# Patient Record
Sex: FEMALE | Race: WHITE | NOT HISPANIC OR LATINO | Employment: UNEMPLOYED | URBAN - METROPOLITAN AREA
[De-identification: names, ages, dates, MRNs, and addresses within clinical notes are randomized per-mention and may not be internally consistent; named-entity substitution may affect disease eponyms.]

---

## 2017-05-15 ENCOUNTER — HOSPITAL ENCOUNTER (EMERGENCY)
Facility: OTHER | Age: 52
Discharge: HOME | End: 2017-05-15
Attending: EMERGENCY MEDICINE
Payer: MEDICAID

## 2017-05-15 VITALS
TEMPERATURE: 97 F | RESPIRATION RATE: 17 BRPM | BODY MASS INDEX: 35.85 KG/M2 | HEIGHT: 64 IN | SYSTOLIC BLOOD PRESSURE: 137 MMHG | WEIGHT: 210 LBS | DIASTOLIC BLOOD PRESSURE: 73 MMHG | HEART RATE: 70 BPM | OXYGEN SATURATION: 97 %

## 2017-05-15 DIAGNOSIS — N39.0 URINARY TRACT INFECTION WITHOUT HEMATURIA, SITE UNSPECIFIED: Primary | ICD-10-CM

## 2017-05-15 LAB
BILIRUBIN, POC UA: NEGATIVE
BLOOD, POC UA: NEGATIVE
CLARITY, POC UA: CLEAR
COLOR, POC UA: YELLOW
GLUCOSE, POC UA: NEGATIVE
KETONES, POC UA: NEGATIVE
LEUKOCYTE EST, POC UA: ABNORMAL
NITRITE, POC UA: NEGATIVE
PH UR STRIP: 5.5 [PH]
PROTEIN, POC UA: NEGATIVE
SPECIFIC GRAVITY, POC UA: <=1.005
UROBILINOGEN, POC UA: 0.2 E.U./DL

## 2017-05-15 PROCEDURE — 81003 URINALYSIS AUTO W/O SCOPE: CPT

## 2017-05-15 PROCEDURE — 99283 EMERGENCY DEPT VISIT LOW MDM: CPT | Mod: 25

## 2017-05-15 RX ORDER — PHENAZOPYRIDINE HYDROCHLORIDE 200 MG/1
200 TABLET, FILM COATED ORAL 3 TIMES DAILY
Qty: 6 TABLET | Refills: 0 | Status: SHIPPED | OUTPATIENT
Start: 2017-05-15 | End: 2017-05-25

## 2017-05-15 RX ORDER — NITROFURANTOIN (MACROCRYSTALS) 100 MG/1
100 CAPSULE ORAL EVERY 12 HOURS
Qty: 14 CAPSULE | Refills: 0 | Status: SHIPPED | OUTPATIENT
Start: 2017-05-15 | End: 2017-05-22

## 2017-05-15 NOTE — ED PROVIDER NOTES
"Encounter Date: 5/15/2017       History     Chief Complaint   Patient presents with    Female  Problem     burning with urination onset 4 days     Review of patient's allergies indicates:  No Known Allergies  The history is provided by the patient.   52 -year-old who says she has a "UTI".  Patient reports dysuria, hematuria, frequency for the last 4 days.  She has lower abdominal pain which she describes as burning.  No fever.  She does have mild pain across her lower back.  No nausea or vomiting.  Patient is a diabetic and said her blood sugar was 77 just prior to arrival.  She denies signs of a yeast infection.  Her symptoms worsen with urination.  She has been taking Azo and drinking cranberry juice without improvement.  Past Medical History:   Diagnosis Date    Diabetes mellitus     Hypercholesteremia     Hypertension     Thyroid disease      Past Surgical History:   Procedure Laterality Date    TUBAL LIGATION       History reviewed. No pertinent family history.  Social History   Substance Use Topics    Smoking status: Current Some Day Smoker     Last attempt to quit: 11/2/2012    Smokeless tobacco: None    Alcohol use None     Review of Systems   Constitutional: Negative for fever.   Gastrointestinal: Positive for abdominal pain. Negative for nausea and vomiting.   Genitourinary: Positive for dysuria, frequency and urgency.   Musculoskeletal: Positive for back pain.       Physical Exam   Initial Vitals   BP Pulse Resp Temp SpO2   05/15/17 0922 05/15/17 0922 05/15/17 0922 05/15/17 0922 05/15/17 0922   137/73 70 17 97.2 °F (36.2 °C) 97 %     Physical Exam    Nursing note and vitals reviewed.  Constitutional: No distress.   HENT:   Head: Atraumatic.   Eyes: Conjunctivae are normal.   Pulmonary/Chest: Breath sounds normal.   Abdominal: There is tenderness in the suprapubic area.   Neurological: She is alert and oriented to person, place, and time.   Skin: Skin is warm.   Psychiatric: She has a normal " "mood and affect.         ED Course   Procedures  Labs Reviewed   POCT URINALYSIS W/O SCOPE - Abnormal; Notable for the following:        Result Value    Glucose, UA Negative (*)     Bilirubin, UA Negative (*)     Ketones, UA Negative (*)     Spec Grav UA <=1.005 (*)     Blood, UA Negative (*)     Protein, UA Negative (*)     Nitrite, UA Negative (*)     All other components within normal limits   CULTURE, URINE   POCT URINALYSIS W/O SCOPE   POCT GLUCOSE MONITORING CONTINUOUS             Medical Decision Making:   Initial Assessment:   Patient presents with "UTI" symptoms.  She is alert and in no acute distress on exam.  ED Management:  UA was positive for white blood cells.  Culture was sent.  Patient was discharged on Pyridium and Macrodantin.  She was advised that she should return to the emergency department if she develops fever, vomiting, worsening of symptoms.                    ED Course     Clinical Impression:   The encounter diagnosis was Urinary tract infection without hematuria, site unspecified.          Pattie Lorenzo MD  05/15/17 1505    "

## 2017-05-15 NOTE — ED TRIAGE NOTES
51 y/o female presents to the ER with the cc of pain with urination and burning. Patient reports she has discomfort to the left back and flank area also. Patient reports she has had these symptoms before but these are worse. Patient denies blood in her urine denies abdominal cramping denies nausea or vomiting

## 2017-05-15 NOTE — ED AVS SNAPSHOT
[facility] EMERGENCY DEPARTMENT  [address] [facility] [address]               Kenna Robert   5/15/2017  9:21 AM   ED    Description:  Female : 1965   Department:  [facility] Emergency Department           Your Care was Coordinated By:     Provider Role From To    Pattie Lorenzo MD Attending Provider 05/15/17 0955 --      Reason for Visit     Female  Problem           Diagnoses this Visit        Comments    Urinary tract infection without hematuria, site unspecified    -  Primary       ED Disposition     None           To Do List           Follow-up Information     Follow up with Gio Barroso MD In 2 days.    Specialty:  Internal Medicine    Contact information:    [address] [facility] [address]  [phone]          Follow up with [facility] Emergency Department.    Specialty:  Emergency Medicine    Why:  If symptoms worsen    Contact information:    [address] [facility] [address]  [phone]       These Medications        Disp Refills Start End    nitrofurantoin (MACRODANTIN) 100 MG capsule 14 capsule 0 5/15/2017 2017    Take 1 capsule (100 mg total) by mouth every 12 (twelve) hours. - Oral    phenazopyridine (PYRIDIUM) 200 MG tablet 6 tablet 0 5/15/2017 2017    Take 1 tablet (200 mg total) by mouth 3 (three) times daily. - Oral      [facility]     [facility] On Call Nurse Care Line - 24/ Assistance  Unless otherwise directed by your provider, please contact [facility], our nurse care line that is available for  assistance.     Registered nurses in the [facility] On Call Center provide: appointment scheduling, clinical advisement, health education, and other advisory services.  Call: [phone] (toll free)               Medications           START taking these NEW medications        Refills    nitrofurantoin (MACRODANTIN) 100 MG capsule 0    Sig: Take 1 capsule (100 mg total) by mouth every 12 (twelve) hours.    Class: Print  "   Route: Oral    phenazopyridine (PYRIDIUM) 200 MG tablet 0    Sig: Take 1 tablet (200 mg total) by mouth 3 (three) times daily.    Class: Print    Route: Oral           Verify that the below list of medications is an accurate representation of the medications you are currently taking.  If none reported, the list may be blank. If incorrect, please contact your healthcare provider. Carry this list with you in case of emergency.           Current Medications     acetaminophen (TYLENOL) 500 MG tablet Take 2 tablets (1,000 mg total) by mouth 3 (three) times daily as needed for Pain.    citalopram (CELEXA) 10 MG tablet Take 20 mg by mouth once daily.     fluticasone (FLONASE) 50 mcg/actuation nasal spray 1 spray by Each Nare route 2 (two) times daily as needed for Rhinitis.    furosemide (LASIX) 20 MG tablet Take 20 mg by mouth 2 (two) times daily.    gemfibrozil (LOPID) 600 MG tablet Take 600 mg by mouth 2 (two) times daily before meals.    ibuprofen (ADVIL,MOTRIN) 200 MG tablet Take 2 tablets (400 mg total) by mouth every 6 (six) hours as needed for Pain.    levothyroxine (SYNTHROID) 100 MCG tablet Take 100 mcg by mouth once daily.      metformin (GLUCOPHAGE) 1000 MG tablet Take 1,000 mg by mouth 2 (two) times daily with meals.    nitrofurantoin (MACRODANTIN) 100 MG capsule Take 1 capsule (100 mg total) by mouth every 12 (twelve) hours.    omeprazole (PRILOSEC) 20 MG capsule Take 20 mg by mouth once daily.    phenazopyridine (PYRIDIUM) 200 MG tablet Take 1 tablet (200 mg total) by mouth 3 (three) times daily.           Clinical Reference Information           Your Vitals Were     BP Pulse Temp Resp Height Weight    137/73 (BP Location: Left arm, Patient Position: Sitting) 70 97.2 °F (36.2 °C) (Temporal) 17 5' 4" (1.626 m) 95.3 kg (210 lb)    Last Period SpO2 BMI          (Approximate) 97% 36.05 kg/m2        Allergies as of 5/15/2017     No Known Allergies      Immunizations Administered on Date of Encounter - " "5/15/2017     None      ED Micro, Lab, POCT     Start Ordered       Status Ordering Provider    05/15/17 1022 05/15/17 1021  Urine culture  Once      Ordered     05/15/17 0958 05/15/17 0957  POCT glucose  Once      Completed     05/15/17 0930 05/15/17 0930  POCT URINALYSIS W/O SCOPE  Once      Final result     05/15/17 0928 05/15/17 0928  POCT URINALYSIS W/O SCOPE  Once      Completed       ED Imaging Orders     None        Discharge Instructions         Bladder Infection, Female (Adult)    Urine is normally doesn't have any bacteria in it. But bacteria can get into the urinary tract from the skin around the rectum. Or they can travel in the blood from elsewhere in the body. Once they are in your urinary tract, they can cause infection in the urethra (urethritis), the bladder (cystitis), or the kidneys (pyelonephritis).  The most common place for an infection is in the bladder. This is called a bladder infection. This is one of the most common infections in women. Most bladder infections are easily treated. They are not serious unless the infection spreads to the kidney.  The phrases "bladder infection," "UTI," and "cystitis" are often used to describe the same thing. But they are not always the same. Cystitis is an inflammation of the bladder. The most common cause of cystitis is an infection.  Symptoms  The infection causes inflammation in the urethra and bladder. This causes many of the symptoms. The most common symptoms of a bladder infection are:  · Pain or burning when urinating  · Having to urinate more often than usual  · Urgent need to urinate  · Only a small amount of urine comes out  · Blood in urine  · Abdominal discomfort. This is usually in the lower abdomen above the pubic bone.  · Cloudy urine  · Strong- or bad-smelling urine  · Unable to urinate (urinary retention)  · Unable to hold urine in (urinary incontinence)  · Fever  · Loss of appetite  · Confusion (in older adults)  Causes  Bladder " infections are not contagious. You can't get one from someone else, from a toilet seat, or from sharing a bath.  The most common cause of bladder infections is bacteria from the bowels. The bacteria get onto the skin around the opening of the urethra. From there, they can get into the urine and travel up to the bladder, causing inflammation and infection. This usually happens because of:  · Wiping improperly after urinating. Always wipe from front to back.  · Bowel incontinence  · Pregnancy  · Procedures such as having a catheter inserted  · Older age  · Not emptying your bladder. This can allow bacteria a chance to grow in your urine.  · Dehydration  · Constipation  · Sex  · Use of a diaphragm for birth control   Treatment  Bladder infections are diagnosed by a urine test. They are treated with antibiotics and usually clear up quickly without complications. Treatment helps prevent a more serious kidney infection.  Medicines  Medicines can help in the treatment of a bladder infection:  · Take antibiotics until they are used up, even if you feel better. It is important to finish them to make sure the infection has cleared.  · You can use acetaminophen or ibuprofen for pain, fever, or discomfort, unless another medicine was prescribed. If you have chronic liver or kidney disease, talk with your healthcare provider before using these medicines. Also talk with your provider if you've ever had a stomach ulcer or gastrointestinal bleeding, or are taking blood-thinner medicines.  · If you are given phenazopydridine to reduce burning with urination, it will cause your urine to become a bright orange color. This can stain clothing.  Care and prevention  These self-care steps can help prevent future infections:  · Drink plenty of fluids to prevent dehydration and flush out your bladder. Do this unless you must restrict fluids for other health reasons, or your doctor told you not to.  · Proper cleaning after going to the  bathroom is important. Wipe from front to back after using the toilet to prevent the spread of bacteria.  · Urinate more often. Don't try to hold urine in for a long time.  · Wear loose-fitting clothes and cotton underwear. Avoid tight-fitting pants.  · Improve your diet and prevent constipation. Eat more fresh fruit and vegetables, and fiber, and less junk and fatty foods.  · Avoid sex until your symptoms are gone.  · Avoid caffeine, alcohol, and spicy foods. These can irritate your bladder.  · Urinate right after intercourse to flush out your bladder.  · If you use birth control pills and have frequent bladder infections, discuss it with your doctor.  Follow-up care  Call your healthcare provider if all symptoms are not gone after 3 days of treatment. This is especially important if you have repeat infections.  If a culture was done, you will be told if your treatment needs to be changed. If directed, you can call to find out the results.  If X-rays were done, you will be told if the results will affect your treatment.  Call 911  Call 911 if any of the following occur:  · Trouble breathing  · Hard to wake up or confusion  · Fainting or loss of consciousness  · Rapid heart rate  When to seek medical advice  Call your healthcare provider right away if any of these occur:  · Fever of 100.4ºF (38.0ºC) or higher, or as directed by your healthcare provider  · Symptoms are not better by the third day of treatment  · Back or belly (abdominal) pain that gets worse  · Repeated vomiting, or unable to keep medicine down  · Weakness or dizziness  · Vaginal discharge  · Pain, redness, or swelling in the outer vaginal area (labia)  Date Last Reviewed: 10/1/2016  © 5202-7642 [employer]. [address] [place] [address], [place] [address]. All rights reserved. This information is not intended as a substitute for professional medical care. Always follow your healthcare professional's instructions.          Smoking Cessation      If you would like to quit smoking:   You may be eligible for free services if you are a [place] resident and started smoking cigarettes before September 1, 1988.  Call the Smoking Cessation Trust (SCT) toll free at (759) 940-8302 or (794) 900-9989.   Call 1-800-QUIT-NOW if you do not meet the above criteria.   Contact us via email: [email or web address]   View our website for more information: [email or web address]         [facility] complies with applicable Federal civil rights laws and does not discriminate on the basis of race, color, national origin, age, disability, or sex.        Language Assistance Services     ATTENTION: Language assistance services are available, free of charge. Please call [phone].      ATENCIÓN: Si habla español, tiene a mckeon disposición servicios gratuitos de asistencia lingüística. Llame al [phone].     CHÚ Ý: N?u b?n nói Ti?ng Vi?t, có các d?ch v? h? tr? ngôn ng? mi?n phí dành cho b?n. G?i s? [phone].

## 2017-05-15 NOTE — DISCHARGE INSTRUCTIONS

## 2018-06-04 ENCOUNTER — HOSPITAL ENCOUNTER (EMERGENCY)
Facility: HOSPITAL | Age: 53
Discharge: HOME | End: 2018-06-04
Attending: EMERGENCY MEDICINE
Payer: MEDICAID

## 2018-06-04 VITALS
RESPIRATION RATE: 19 BRPM | SYSTOLIC BLOOD PRESSURE: 111 MMHG | HEIGHT: 64 IN | TEMPERATURE: 98 F | BODY MASS INDEX: 30.05 KG/M2 | DIASTOLIC BLOOD PRESSURE: 75 MMHG | OXYGEN SATURATION: 98 % | WEIGHT: 176 LBS | HEART RATE: 68 BPM

## 2018-06-04 DIAGNOSIS — H92.02 OTALGIA OF LEFT EAR: Primary | ICD-10-CM

## 2018-06-04 LAB — GLUCOSE SERPL-MCNC: 103 MG/DL (ref 70–110)

## 2018-06-04 PROCEDURE — 99283 EMERGENCY DEPT VISIT LOW MDM: CPT

## 2018-06-04 PROCEDURE — 25000003 PHARM REV CODE 250: Performed by: PHYSICIAN ASSISTANT

## 2018-06-04 RX ORDER — AMOXICILLIN 250 MG/1
500 CAPSULE ORAL
Status: COMPLETED | OUTPATIENT
Start: 2018-06-04 | End: 2018-06-04

## 2018-06-04 RX ORDER — AMOXICILLIN 500 MG/1
500 CAPSULE ORAL EVERY 12 HOURS
Qty: 20 CAPSULE | Refills: 0 | Status: SHIPPED | OUTPATIENT
Start: 2018-06-04 | End: 2018-06-14

## 2018-06-04 RX ADMIN — AMOXICILLIN 500 MG: 250 CAPSULE ORAL at 02:06

## 2018-06-04 NOTE — DISCHARGE INSTRUCTIONS
Use medications as prescribed. Follow-up with your primary care provider for reevaluation and further recommendations. Return to this ED if you begin with fever, if pain worsens, if you begin with neck or facial swelling, if any other problems occur.

## 2018-06-04 NOTE — ED PROVIDER NOTES
Encounter Date: 6/4/2018       History     Chief Complaint   Patient presents with    Otalgia     pt reports L ear pain since Thursday, w drainage     54yo F with chief complaint L otalgia x 5 days. She does admit to serous drainage from ear over the past two days. No fever or chills. No recent swimming excursions. She does endorse decreased hearing to affected ear. No facial swelling. No neck pain or stiffness. No odynophagia or dysphagia. Pain constant, worsening. No alleviating or exacerbating factors. No radiation of pain.          Review of patient's allergies indicates:  No Known Allergies  Past Medical History:   Diagnosis Date    Diabetes mellitus     Hypercholesteremia     Hypertension     Thyroid disease      Past Surgical History:   Procedure Laterality Date    TUBAL LIGATION       History reviewed. No pertinent family history.  Social History   Substance Use Topics    Smoking status: Current Some Day Smoker     Last attempt to quit: 11/2/2012    Smokeless tobacco: Not on file    Alcohol use Not on file     Review of Systems   Constitutional: Negative for fever.   HENT: Positive for ear discharge and ear pain. Negative for congestion, facial swelling, rhinorrhea and sore throat.    Eyes: Negative.    Respiratory: Negative for shortness of breath.    Cardiovascular: Negative for chest pain.   Gastrointestinal: Negative for nausea and vomiting.   Endocrine: Negative.    Genitourinary: Negative for dysuria.   Musculoskeletal: Negative for back pain, neck pain and neck stiffness.   Skin: Negative for rash.   Neurological: Negative for weakness and headaches.   Hematological: Does not bruise/bleed easily.   Psychiatric/Behavioral: Negative.    All other systems reviewed and are negative.      Physical Exam     Initial Vitals [06/04/18 1357]   BP Pulse Resp Temp SpO2   137/86 81 19 97.7 °F (36.5 °C) 99 %      MAP       103         Physical Exam    Nursing note and vitals reviewed.  Constitutional: She  appears well-developed and well-nourished. She is not diaphoretic. No distress.   HENT:   Head: Normocephalic and atraumatic.   Mouth/Throat: Oropharynx is clear and moist.   L TM with scarring, possible small perforation to 2 o'clock area. TM is dull, without loss of landmarks, no hyperemia, however possible purulent effusion. Ear canal without hyperemia, edema, or exudate. No mastoid swelling or ttp. Mild preauricular ttp. Mild ear motion pain.   Eyes: Conjunctivae and EOM are normal. Pupils are equal, round, and reactive to light.   Neck: Normal range of motion. Neck supple. No tracheal deviation present.   Cardiovascular: Intact distal pulses.   Pulmonary/Chest: Breath sounds normal. No stridor. No respiratory distress. She has no wheezes.   Abdominal: Soft. Bowel sounds are normal. She exhibits no distension. There is no tenderness.   Musculoskeletal: Normal range of motion. She exhibits no tenderness.   Lymphadenopathy:     She has no cervical adenopathy.   Neurological: She is alert and oriented to person, place, and time.   Skin: Skin is warm and dry. Capillary refill takes less than 2 seconds.   Psychiatric: She has a normal mood and affect. Her behavior is normal. Judgment and thought content normal.         ED Course   Procedures  Labs Reviewed   POCT GLUCOSE             Medical Decision Making:   Differential Diagnosis:   Otitis media, otitis externa, mastoiditis, viral illness  ED Management:  53-year-old female chief complaint otalgia with drainage.  She denies fever or chills.  Denies odynophagia or dysphagia.  No neck pain or stiffness.  She does admit to decreased hearing to affected ear.  Overall well-appearing and nontoxic.  Vitals reassuring.  She does admit to frequent ear infections over the past few years. On exam, possible otitis media with perforation. Ear canal is unremarkable, however pt is diabetic. There is preauricular and ear motion pain. No lymphadenopathy. No mastoid swelling or ttp.  Neck supple. Given presentation, will d/c with PO and topical abx. I do feel she is safe and stable for d/c without further intervention. She does understand and agree. Return precautions given.  Other:   I have discussed this case with another health care provider.       <> Summary of the Discussion: Dr. Lorenzo              Attending Attestation:     Physician Attestation Statement for NP/PA:   I have conducted a face to face encounter with this patient in addition to the NP/PA, due to NP/PA Request    Other NP/PA Attestation Additions:    History of Present Illness: 53-year-old who complains of pain and drainage from her left ear   Physical Exam: Patient has scarring and mild yellow drainage noted to the tympanic membrane   Medical Decision Making: Patient will be treated with corticosporin  otic and amoxicillin.  She will be referred to ENT.                    Clinical Impression:   The encounter diagnosis was Otalgia of left ear.  Disposition:   Disposition: Discharged  Condition: Stable                        Jacob Franz PA-C  06/04/18 1504       Pattie Lorenzo MD  06/04/18 9650

## 2018-09-12 ENCOUNTER — HOSPITAL ENCOUNTER (EMERGENCY)
Facility: HOSPITAL | Age: 53
Discharge: HOME | End: 2018-09-13
Attending: EMERGENCY MEDICINE
Payer: MEDICAID

## 2018-09-12 DIAGNOSIS — M79.671 FOOT PAIN, RIGHT: Primary | ICD-10-CM

## 2018-09-12 DIAGNOSIS — S90.31XA CONTUSION OF RIGHT FOOT, INITIAL ENCOUNTER: ICD-10-CM

## 2018-09-12 PROCEDURE — 99283 EMERGENCY DEPT VISIT LOW MDM: CPT | Mod: 25

## 2018-09-12 RX ORDER — IBUPROFEN 400 MG/1
800 TABLET ORAL
Status: DISCONTINUED | OUTPATIENT
Start: 2018-09-12 | End: 2018-09-13 | Stop reason: HOSPADM

## 2018-09-13 VITALS
BODY MASS INDEX: 29.02 KG/M2 | OXYGEN SATURATION: 95 % | DIASTOLIC BLOOD PRESSURE: 85 MMHG | WEIGHT: 170 LBS | TEMPERATURE: 98 F | HEIGHT: 64 IN | RESPIRATION RATE: 18 BRPM | HEART RATE: 65 BPM | SYSTOLIC BLOOD PRESSURE: 148 MMHG

## 2018-09-13 NOTE — ED TRIAGE NOTES
Patient stated she was leaving out of ED door from visiting friend in ICU and stepped in hole and twisted her right foot. Pain 8/10. Throbbing and aching in nature.

## 2018-09-13 NOTE — ED PROVIDER NOTES
Encounter Date: 2018     This is a 53 y.o. female complaining of right ankle and foot pain secondary to tripping and falling in the parking lot while leaving the hospital after visiting another patient.    I have evaluated and conducted a medical screening exam with initial orders entered, if indicated, to expedite care. The patient will be placed in a treatment area when one is available. Care will be transferred to an alternate provider for a full assessment including but not limited to: history, physical exam, additional orders, and final disposition.    Toni Bragg NP         History     Chief Complaint   Patient presents with    Ankle Pain     recent trip and fall minutes P[employer]; reports right ankle pain on right side of foot; mild swelling noted, not painful to palpation     52 y/o female which presents with right foot pain after she stepped in a hole outside of the hospital. She was at [facility] visiting a friend in the ICU and upon exiting through the ED doors, she stepped in a hole near a manhole. The patient's foot slipped in the hole and now she is having right foot pain. Pt denies edema, bruising, tingling, numbness or loss of mobility. Pt denies ankle pain.       The history is provided by the patient.     Review of patient's allergies indicates:  No Known Allergies  Past Medical History:   Diagnosis Date    Hypercholesteremia     Hypertension     Thyroid disease      Past Surgical History:   Procedure Laterality Date    TUBAL LIGATION       History reviewed. No pertinent family history.  Social History     Tobacco Use    Smoking status: Current Some Day Smoker     Last attempt to quit: 2012     Years since quittin.8   Substance Use Topics    Alcohol use: No     Frequency: Never    Drug use: No     Review of Systems   Constitutional: Negative for chills and fever.   Respiratory: Negative for chest tightness and stridor.    Cardiovascular: Negative for chest pain.    Gastrointestinal: Negative for abdominal pain.   Musculoskeletal: Positive for myalgias. Negative for arthralgias, gait problem and joint swelling.   All other systems reviewed and are negative.      Physical Exam     Initial Vitals [09/12/18 2234]   BP Pulse Resp Temp SpO2   (!) 179/94 83 16 98.1 °F (36.7 °C) 97 %      MAP       --         Physical Exam    Nursing note and vitals reviewed.  Constitutional: She appears well-developed and well-nourished.   HENT:   Head: Normocephalic and atraumatic.   Eyes: EOM are normal. Pupils are equal, round, and reactive to light.   Cardiovascular: Normal rate, regular rhythm, normal heart sounds and intact distal pulses. Exam reveals no gallop and no friction rub.    No murmur heard.  Pulmonary/Chest: Breath sounds normal. No respiratory distress. She has no wheezes. She has no rhonchi. She has no rales. She exhibits no tenderness.   Abdominal: Soft. Bowel sounds are normal.   Musculoskeletal: Normal range of motion. She exhibits tenderness. She exhibits no edema.        Right ankle: She exhibits normal range of motion, no swelling, no ecchymosis, no deformity, no laceration and normal pulse. Tenderness. No lateral malleolus, no medial malleolus, no AITFL, no CF ligament, no posterior TFL, no head of 5th metatarsal and no proximal fibula tenderness found. Achilles tendon normal.        Feet:    Pinpoint tenderness to base of the 5th metatarsal- no edema or ecchymosis noted    Neurological: She is alert and oriented to person, place, and time. She has normal strength. GCS score is 15. GCS eye subscore is 4. GCS verbal subscore is 5. GCS motor subscore is 6.       ED Course   Procedures  Labs Reviewed - No data to display       Imaging Results          X-Ray Foot Complete Right (Final result)  Result time 09/12/18 23:30:31    Final result by Jose Chun MD (09/12/18 23:30:31)                 Impression:      1. No displaced fractures.      Electronically signed  by: Jose Chun MD  Date:    09/12/2018  Time:    23:30             Narrative:    EXAMINATION:  XR ANKLE COMPLETE 3 VIEW RIGHT; XR FOOT COMPLETE 3 VIEW RIGHT    CLINICAL HISTORY:  Fall on same level from slipping, tripping and stumbling without subsequent striking against object, initial encounter    TECHNIQUE:  AP, lateral, and oblique images of the right ankle and foot were performed.    COMPARISON:  None.    FINDINGS:  No displaced fractures.  No osseous destruction.  Tibial plafond and talar dome are normal.  Ankle mortise is symmetric.  Tarsometatarsal alignment is maintained noting nonweightbearing films.  Distal Achilles and plantar fascia enthesopathy noted.  Subcutaneous soft tissues are within normal limits.  No radiopaque foreign bodies.                               X-Ray Ankle Complete Right (Final result)  Result time 09/12/18 23:30:31    Final result by Jose Chun MD (09/12/18 23:30:31)                 Impression:      1. No displaced fractures.      Electronically signed by: Jose Chun MD  Date:    09/12/2018  Time:    23:30             Narrative:    EXAMINATION:  XR ANKLE COMPLETE 3 VIEW RIGHT; XR FOOT COMPLETE 3 VIEW RIGHT    CLINICAL HISTORY:  Fall on same level from slipping, tripping and stumbling without subsequent striking against object, initial encounter    TECHNIQUE:  AP, lateral, and oblique images of the right ankle and foot were performed.    COMPARISON:  None.    FINDINGS:  No displaced fractures.  No osseous destruction.  Tibial plafond and talar dome are normal.  Ankle mortise is symmetric.  Tarsometatarsal alignment is maintained noting nonweightbearing films.  Distal Achilles and plantar fascia enthesopathy noted.  Subcutaneous soft tissues are within normal limits.  No radiopaque foreign bodies.                                 Medical Decision Making:   Initial Assessment:   54 y/o female with right foot pain after her foot slipped in a hole outside of the ED  entrance. No edema or ecchymosis noted. Pinpoint tenderness to the base of the 5th metatarsal.  Differential Diagnosis:   Foot fracture, foot contusion, ankle sprain   Clinical Tests:   Radiological Study: Reviewed and Ordered  ED Management:  Pt examined. Radiological studies were negative for fracture. Pt did not want to wait for the ibuprofen ordered and said she can take it at home. Pt given strict return precautions and voiced understanding.                       Clinical Impression:   The primary encounter diagnosis was Foot pain, right. A diagnosis of Contusion of right foot, initial encounter was also pertinent to this visit.                             Nusrat Molina, JESSY  09/12/18 [id number]

## 2019-01-10 ENCOUNTER — HOSPITAL ENCOUNTER (EMERGENCY)
Facility: HOSPITAL | Age: 54
Discharge: HOME | End: 2019-01-10
Attending: EMERGENCY MEDICINE
Payer: MEDICAID

## 2019-01-10 VITALS
OXYGEN SATURATION: 99 % | HEIGHT: 64 IN | SYSTOLIC BLOOD PRESSURE: 155 MMHG | BODY MASS INDEX: 31.58 KG/M2 | TEMPERATURE: 98 F | WEIGHT: 185 LBS | HEART RATE: 86 BPM | DIASTOLIC BLOOD PRESSURE: 76 MMHG | RESPIRATION RATE: 16 BRPM

## 2019-01-10 DIAGNOSIS — J01.00 ACUTE NON-RECURRENT MAXILLARY SINUSITIS: ICD-10-CM

## 2019-01-10 DIAGNOSIS — J32.9 SINUSITIS, UNSPECIFIED CHRONICITY, UNSPECIFIED LOCATION: Primary | ICD-10-CM

## 2019-01-10 PROCEDURE — 99284 EMERGENCY DEPT VISIT MOD MDM: CPT | Mod: 25,ER

## 2019-01-10 PROCEDURE — 94640 AIRWAY INHALATION TREATMENT: CPT | Mod: ER

## 2019-01-10 PROCEDURE — 96372 THER/PROPH/DIAG INJ SC/IM: CPT | Mod: ER

## 2019-01-10 PROCEDURE — 94760 N-INVAS EAR/PLS OXIMETRY 1: CPT | Mod: ER

## 2019-01-10 PROCEDURE — 63600175 PHARM REV CODE 636 W HCPCS: Mod: ER | Performed by: NURSE PRACTITIONER

## 2019-01-10 PROCEDURE — 25000242 PHARM REV CODE 250 ALT 637 W/ HCPCS: Mod: ER | Performed by: NURSE PRACTITIONER

## 2019-01-10 RX ORDER — IPRATROPIUM BROMIDE AND ALBUTEROL SULFATE 2.5; .5 MG/3ML; MG/3ML
3 SOLUTION RESPIRATORY (INHALATION) ONCE
Status: COMPLETED | OUTPATIENT
Start: 2019-01-10 | End: 2019-01-10

## 2019-01-10 RX ORDER — PROMETHAZINE HYDROCHLORIDE AND DEXTROMETHORPHAN HYDROBROMIDE 6.25; 15 MG/5ML; MG/5ML
5 SYRUP ORAL EVERY 6 HOURS PRN
Qty: 120 ML | Refills: 0 | Status: SHIPPED | OUTPATIENT
Start: 2019-01-10 | End: 2019-01-20

## 2019-01-10 RX ORDER — DEXAMETHASONE SODIUM PHOSPHATE 4 MG/ML
12 INJECTION, SOLUTION INTRA-ARTICULAR; INTRALESIONAL; INTRAMUSCULAR; INTRAVENOUS; SOFT TISSUE
Status: DISCONTINUED | OUTPATIENT
Start: 2019-01-10 | End: 2019-01-10

## 2019-01-10 RX ORDER — DEXAMETHASONE SODIUM PHOSPHATE 4 MG/ML
8 INJECTION, SOLUTION INTRA-ARTICULAR; INTRALESIONAL; INTRAMUSCULAR; INTRAVENOUS; SOFT TISSUE
Status: COMPLETED | OUTPATIENT
Start: 2019-01-10 | End: 2019-01-10

## 2019-01-10 RX ORDER — ALBUTEROL SULFATE 2.5 MG/.5ML
2.5 SOLUTION RESPIRATORY (INHALATION) EVERY 4 HOURS PRN
Qty: 1 EACH | Refills: 0 | OUTPATIENT
Start: 2019-01-10 | End: 2023-12-01

## 2019-01-10 RX ORDER — AMOXICILLIN 875 MG/1
875 TABLET, FILM COATED ORAL 2 TIMES DAILY
Qty: 20 TABLET | Refills: 0 | Status: SHIPPED | OUTPATIENT
Start: 2019-01-10 | End: 2019-01-20

## 2019-01-10 RX ORDER — FLUTICASONE PROPIONATE 50 MCG
1 SPRAY, SUSPENSION (ML) NASAL 2 TIMES DAILY PRN
Qty: 1 BOTTLE | Refills: 1 | Status: SHIPPED | OUTPATIENT
Start: 2019-01-10

## 2019-01-10 RX ADMIN — IPRATROPIUM BROMIDE AND ALBUTEROL SULFATE 3 ML: .5; 3 SOLUTION RESPIRATORY (INHALATION) at 10:01

## 2019-01-10 RX ADMIN — DEXAMETHASONE SODIUM PHOSPHATE 8 MG: 4 INJECTION, SOLUTION INTRA-ARTICULAR; INTRALESIONAL; INTRAMUSCULAR; INTRAVENOUS; SOFT TISSUE at 09:01

## 2019-01-11 NOTE — DISCHARGE INSTRUCTIONS
Follow-up with PCP in 1-2 days.  Return to ED for worsening of symptoms.   Tylenol as needed for fever.

## 2019-01-11 NOTE — ED PROVIDER NOTES
Encounter Date: 1/10/2019       History     Chief Complaint   Patient presents with    Cough     pt presents to ER with c/o uri symptoms for 4-5 days,  +fever +productive cough.       A nontoxic 53-year-old female who presents with nasal congestion and a productive cough for 4-5 days.  Patient reports a low-grade fever.  Patient denies sick contacts. She smokes 1 ppd cigarettes.       The history is provided by the patient.   Cough   This is a new problem. The current episode started several days ago. The problem has been gradually worsening. The cough is productive of purulent sputum. The maximum temperature recorded prior to her arrival was 100 - 100.9 F. Pertinent negatives include no chest pain and no shortness of breath. She has tried nothing for the symptoms.     Review of patient's allergies indicates:   Allergen Reactions    Lisinopril      Bad cough    Statins-hmg-coa reductase inhibitors      Muscle aches     Past Medical History:   Diagnosis Date    Hypercholesteremia     Hypertension     Thyroid disease      Past Surgical History:   Procedure Laterality Date    TUBAL LIGATION       History reviewed. No pertinent family history.  Social History     Tobacco Use    Smoking status: Current Some Day Smoker     Last attempt to quit: 2012     Years since quittin.1   Substance Use Topics    Alcohol use: No     Frequency: Never    Drug use: No     Review of Systems   Constitutional: Positive for fever.   HENT: Positive for congestion.    Eyes: Negative.    Respiratory: Positive for cough. Negative for shortness of breath.    Cardiovascular: Negative.  Negative for chest pain.   Gastrointestinal: Negative.    Endocrine: Negative.    Genitourinary: Negative.    Musculoskeletal: Negative.    Skin: Negative.    Allergic/Immunologic: Negative.    Neurological: Negative.    Hematological: Negative.    All other systems reviewed and are negative.      Physical Exam     Initial Vitals [01/10/19 2016]    BP Pulse Resp Temp SpO2   136/69 68 18 97.9 °F (36.6 °C) 96 %      MAP       --         Physical Exam    Nursing note and vitals reviewed.  Constitutional: Vital signs are normal. She appears well-developed. She is cooperative. She does not appear ill.   HENT:   Right Ear: External ear normal.   Left Ear: External ear normal.   Nose: Mucosal edema present. Right sinus exhibits frontal sinus tenderness. Left sinus exhibits frontal sinus tenderness.   Mouth/Throat: Oropharynx is clear and moist.   Eyes: Conjunctivae and lids are normal.   Neck: Normal range of motion. Neck supple.   Cardiovascular: Normal rate, regular rhythm, S1 normal, S2 normal and normal heart sounds.   Pulmonary/Chest: Effort normal. She has wheezes in the right lower field and the left lower field.   Expiratory wheezing   Abdominal: Soft. Normal appearance. There is no tenderness.   Musculoskeletal: Normal range of motion.   Neurological: She is alert and oriented to person, place, and time.   Skin: Skin is warm, dry and intact.   Psychiatric: She has a normal mood and affect. Her speech is normal. Thought content normal. Cognition and memory are normal.         ED Course   Procedures  Labs Reviewed - No data to display       Imaging Results          X-Ray Chest PA And Lateral (Final result)  Result time 01/10/19 20:48:46    Final result by Fei Shultz MD (01/10/19 20:48:46)                 Impression:      1. Hypoventilatory exam, no acute cardiopulmonary process.      Electronically signed by: Fei Shultz MD  Date:    01/10/2019  Time:    20:48             Narrative:    EXAMINATION:  XR CHEST PA AND LATERAL    CLINICAL HISTORY:  cough;    TECHNIQUE:  PA and lateral views of the chest were performed.    COMPARISON:  CT 02/22/2012, radiograph 12/21/2012    FINDINGS:  The cardiomediastinal silhouette is not enlarged.  There is no pleural effusion.  The trachea is midline.  The lungs are symmetrically expanded bilaterally without  evidence of acute parenchymal process. No large focal consolidation seen.  There is no pneumothorax.  The osseous structures are remarkable for degenerative changes..                                 Medical Decision Making:   Initial Assessment:   A nontoxic 53-year-old female who presents with nasal congestion and a productive cough for 4-5 days.  Patient reports a low-grade fever.  Patient denies sick contacts.    Differential Diagnosis:   Acute sinusitis, acute bronchitis, pneumonia  Clinical Tests:   Radiological Study: Ordered and Reviewed  ED Management:  Dual nebs administered with improving of wheezing.   Decadron 8 mg IM.   Discharged home with Amoxicillin, Promethazine DM, and Flonase.  Pt instructed on smoking cessation. Pt verbalized understanding.   Follow-up with PCP in 1-2 days.                       Clinical Impression:   The primary encounter diagnosis was Sinusitis, unspecified chronicity, unspecified location. A diagnosis of Acute non-recurrent maxillary sinusitis was also pertinent to this visit.                             JESSY Cuellar  01/10/19 [id number]

## 2021-07-01 ENCOUNTER — PATIENT MESSAGE (OUTPATIENT)
Dept: ADMINISTRATIVE | Facility: OTHER | Age: 56
End: 2021-07-01

## 2023-09-12 ENCOUNTER — HOSPITAL ENCOUNTER (EMERGENCY)
Facility: HOSPITAL | Age: 58
Discharge: HOME | End: 2023-09-12
Attending: EMERGENCY MEDICINE
Payer: MEDICAID

## 2023-09-12 VITALS
RESPIRATION RATE: 18 BRPM | OXYGEN SATURATION: 95 % | DIASTOLIC BLOOD PRESSURE: 73 MMHG | HEIGHT: 64 IN | HEART RATE: 62 BPM | TEMPERATURE: 98 F | BODY MASS INDEX: 36.19 KG/M2 | SYSTOLIC BLOOD PRESSURE: 133 MMHG | WEIGHT: 212 LBS

## 2023-09-12 DIAGNOSIS — R07.9 CHEST PAIN: Primary | ICD-10-CM

## 2023-09-12 LAB
ALBUMIN SERPL BCP-MCNC: 4.1 G/DL (ref 3.5–5.2)
ALP SERPL-CCNC: 75 U/L (ref 55–135)
ALT SERPL W/O P-5'-P-CCNC: 35 U/L (ref 10–44)
ANION GAP SERPL CALC-SCNC: 9 MMOL/L (ref 8–16)
AST SERPL-CCNC: 48 U/L (ref 10–40)
BASOPHILS # BLD AUTO: 0.12 K/UL (ref 0–0.2)
BASOPHILS NFR BLD: 1.7 % (ref 0–1.9)
BILIRUB SERPL-MCNC: 0.4 MG/DL (ref 0.1–1)
BNP SERPL-MCNC: 11 PG/ML (ref 0–99)
BUN SERPL-MCNC: 13 MG/DL (ref 6–20)
CALCIUM SERPL-MCNC: 10.5 MG/DL (ref 8.7–10.5)
CHLORIDE SERPL-SCNC: 104 MMOL/L (ref 95–110)
CO2 SERPL-SCNC: 24 MMOL/L (ref 23–29)
CREAT SERPL-MCNC: 1 MG/DL (ref 0.5–1.4)
D DIMER PPP IA.FEU-MCNC: 0.3 MG/L FEU
DIFFERENTIAL METHOD: ABNORMAL
EOSINOPHIL # BLD AUTO: 0.2 K/UL (ref 0–0.5)
EOSINOPHIL NFR BLD: 3 % (ref 0–8)
ERYTHROCYTE [DISTWIDTH] IN BLOOD BY AUTOMATED COUNT: 13.1 % (ref 11.5–14.5)
EST. GFR  (NO RACE VARIABLE): >60 ML/MIN/1.73 M^2
GLUCOSE SERPL-MCNC: 126 MG/DL (ref 70–110)
HCT VFR BLD AUTO: 44 % (ref 37–48.5)
HGB BLD-MCNC: 14.9 G/DL (ref 12–16)
IMM GRANULOCYTES # BLD AUTO: 0.04 K/UL (ref 0–0.04)
IMM GRANULOCYTES NFR BLD AUTO: 0.6 % (ref 0–0.5)
LIPASE SERPL-CCNC: 50 U/L (ref 4–60)
LYMPHOCYTES # BLD AUTO: 3 K/UL (ref 1–4.8)
LYMPHOCYTES NFR BLD: 42.8 % (ref 18–48)
MCH RBC QN AUTO: 30.8 PG (ref 27–31)
MCHC RBC AUTO-ENTMCNC: 33.9 G/DL (ref 32–36)
MCV RBC AUTO: 91 FL (ref 82–98)
MONOCYTES # BLD AUTO: 0.4 K/UL (ref 0.3–1)
MONOCYTES NFR BLD: 5.1 % (ref 4–15)
NEUTROPHILS # BLD AUTO: 3.3 K/UL (ref 1.8–7.7)
NEUTROPHILS NFR BLD: 46.8 % (ref 38–73)
NRBC BLD-RTO: 0 /100 WBC
PLATELET # BLD AUTO: 283 K/UL (ref 150–450)
PMV BLD AUTO: 9.8 FL (ref 9.2–12.9)
POCT GLUCOSE: 124 MG/DL (ref 70–110)
POTASSIUM SERPL-SCNC: 4.1 MMOL/L (ref 3.5–5.1)
PROT SERPL-MCNC: 7.8 G/DL (ref 6–8.4)
RBC # BLD AUTO: 4.83 M/UL (ref 4–5.4)
SODIUM SERPL-SCNC: 137 MMOL/L (ref 136–145)
TROPONIN I SERPL DL<=0.01 NG/ML-MCNC: <0.006 NG/ML (ref 0–0.03)
WBC # BLD AUTO: 7.08 K/UL (ref 3.9–12.7)

## 2023-09-12 PROCEDURE — 82962 GLUCOSE BLOOD TEST: CPT

## 2023-09-12 PROCEDURE — 93010 ELECTROCARDIOGRAM REPORT: CPT | Mod: ,,, | Performed by: INTERNAL MEDICINE

## 2023-09-12 PROCEDURE — 99285 EMERGENCY DEPT VISIT HI MDM: CPT | Mod: 25

## 2023-09-12 PROCEDURE — 84484 ASSAY OF TROPONIN QUANT: CPT | Performed by: EMERGENCY MEDICINE

## 2023-09-12 PROCEDURE — 83880 ASSAY OF NATRIURETIC PEPTIDE: CPT | Performed by: EMERGENCY MEDICINE

## 2023-09-12 PROCEDURE — 85379 FIBRIN DEGRADATION QUANT: CPT | Performed by: EMERGENCY MEDICINE

## 2023-09-12 PROCEDURE — 93010 EKG 12-LEAD: ICD-10-PCS | Mod: ,,, | Performed by: INTERNAL MEDICINE

## 2023-09-12 PROCEDURE — 85025 COMPLETE CBC W/AUTO DIFF WBC: CPT | Performed by: EMERGENCY MEDICINE

## 2023-09-12 PROCEDURE — 25000003 PHARM REV CODE 250: Performed by: EMERGENCY MEDICINE

## 2023-09-12 PROCEDURE — 93005 ELECTROCARDIOGRAM TRACING: CPT

## 2023-09-12 PROCEDURE — 83690 ASSAY OF LIPASE: CPT | Performed by: EMERGENCY MEDICINE

## 2023-09-12 PROCEDURE — 80053 COMPREHEN METABOLIC PANEL: CPT | Performed by: EMERGENCY MEDICINE

## 2023-09-12 RX ORDER — FAMOTIDINE 20 MG/1
40 TABLET, FILM COATED ORAL 2 TIMES DAILY
Qty: 20 TABLET | Refills: 0 | Status: SHIPPED | OUTPATIENT
Start: 2023-09-12 | End: 2024-09-11

## 2023-09-12 RX ORDER — ACETAMINOPHEN 500 MG
1000 TABLET ORAL
Status: COMPLETED | OUTPATIENT
Start: 2023-09-12 | End: 2023-09-12

## 2023-09-12 RX ORDER — MAG HYDROX/ALUMINUM HYD/SIMETH 200-200-20
30 SUSPENSION, ORAL (FINAL DOSE FORM) ORAL ONCE
Status: COMPLETED | OUTPATIENT
Start: 2023-09-12 | End: 2023-09-12

## 2023-09-12 RX ORDER — SUCRALFATE 1 G/1
1 TABLET ORAL 4 TIMES DAILY
Qty: 56 TABLET | Refills: 0 | Status: SHIPPED | OUTPATIENT
Start: 2023-09-12 | End: 2023-09-26

## 2023-09-12 RX ADMIN — ALUMINUM HYDROXIDE, MAGNESIUM HYDROXIDE, AND DIMETHICONE 30 ML: 200; 20; 200 SUSPENSION ORAL at 12:09

## 2023-09-12 RX ADMIN — ACETAMINOPHEN 1000 MG: 500 TABLET ORAL at 01:09

## 2023-09-12 NOTE — ED NOTES
57 yo female presents to the ED with c/o CP, SOB, headache, and dizziness since Wednesday. Patient states that she began to feel dizzy with a burning chest pain that radiated to both arms. Patient has previous medical history of HTN, DM, high cholesterol, and low thyroid. Patient is AAOx4, VSS, NAD. Denies N/V/D, cough, fever, numbness, or tingling. Bed locked, in lowest position, bed rails up x2, call light in reach, all monitoring attached.

## 2023-09-12 NOTE — ED PROVIDER NOTES
"Encounter Date: 9/12/2023    SCRIBE #1 NOTE: I, Kristy Lopez, am scribing for, and in the presence of,  Manuel Ocampo MD. I have scribed the following portions of the note - Other sections scribed: HPI, ROS.       History     Chief Complaint   Patient presents with    Chest Pain     59 yo female to triage for intermittent mid sternal chest pain/ burning since last week. Pt sent by PCP for further evaluation. Pt also complains of HA. Denies SOB, N/V/D, Numbness/tingling, and lightheadedness. VSS, NAD, AAOx4     A 58 y. o. female with a PMHx of thyroid disease, HTN, HLD, who presents to the ED for a chief complaint of intermittent mid sternal chest pain onset. Patient reports she had episodes of "chest tingling" radiating to bilateral arms last Wednesday, which was followed by generalized weakness, dizziness, and fatigue. Further reports she was seen by her PCP for the Sx, and was sent to the ED for further evaluation. Endorses "burning" sensation to the heart area, shortness of breath, and dizziness. Patient states she hasn't seen her cardiac doctor for 2 years. Further endorses she is compliant with her medications. No other alleviating or exacerbating factors. Denies any nausea, vomiting, or other associated symptoms. NKDA.       The history is provided by the patient. No  was used.     Review of patient's allergies indicates:   Allergen Reactions    Lisinopril Other (See Comments)     Bad cough  Unspecified    Statins-hmg-coa reductase inhibitors Other (See Comments)     Muscle aches  unspecified     Past Medical History:   Diagnosis Date    Hypercholesteremia     Hypertension     Thyroid disease      Past Surgical History:   Procedure Laterality Date    TUBAL LIGATION       No family history on file.  Social History     Tobacco Use    Smoking status: Some Days     Current packs/day: 0.00     Types: Cigarettes     Last attempt to quit: 11/2/2012     Years since quitting: 10.8 " "  Substance Use Topics    Alcohol use: No    Drug use: No     Review of Systems   Constitutional: Negative.    HENT: Negative.     Eyes: Negative.    Respiratory:  Positive for shortness of breath.    Cardiovascular:  Positive for chest pain (intermittent, mid sternal area).        (+) "burning" sensation to the heart area   Gastrointestinal: Negative.    Genitourinary: Negative.    Musculoskeletal: Negative.    Skin: Negative.    Neurological:  Positive for dizziness.       Physical Exam     Initial Vitals [09/12/23 1145]   BP Pulse Resp Temp SpO2   (!) 160/83 85 17 97.9 °F (36.6 °C) 96 %      MAP       --         Physical Exam    Nursing note and vitals reviewed.  Constitutional: She appears well-developed and well-nourished. She is not diaphoretic. No distress.   HENT:   Head: Normocephalic and atraumatic.   Nose: Nose normal.   Eyes: EOM are normal. Pupils are equal, round, and reactive to light.   Neck: Neck supple. No JVD present.   Normal range of motion.  Cardiovascular:  Normal rate, regular rhythm, normal heart sounds and intact distal pulses.           Pulmonary/Chest: Breath sounds normal. No stridor. No respiratory distress. She has no wheezes. She has no rhonchi. She has no rales.   Abdominal: Abdomen is soft. Bowel sounds are normal. She exhibits no distension. There is abdominal tenderness (mild epigastric tp).   Musculoskeletal:         General: No tenderness or edema. Normal range of motion.      Cervical back: Normal range of motion and neck supple.     Neurological: She is alert and oriented to person, place, and time. She has normal strength.   Skin: Skin is warm and dry. Capillary refill takes less than 2 seconds. No rash noted. No erythema.         ED Course   Procedures  Labs Reviewed   CBC W/ AUTO DIFFERENTIAL - Abnormal; Notable for the following components:       Result Value    Immature Granulocytes 0.6 (*)     All other components within normal limits   COMPREHENSIVE METABOLIC PANEL - " Abnormal; Notable for the following components:    Glucose 126 (*)     AST 48 (*)     All other components within normal limits   POCT GLUCOSE - Abnormal; Notable for the following components:    POCT Glucose 124 (*)     All other components within normal limits   TROPONIN I   B-TYPE NATRIURETIC PEPTIDE   LIPASE   D DIMER, QUANTITATIVE        ECG Results              EKG 12-lead (Final result)  Result time 09/12/23 17:53:29      Final result by Interface, Lab In [facility] (09/12/23 17:53:29)                   Narrative:    Test Reason : R07.9,    Vent. Rate : 085 BPM     Atrial Rate : 085 BPM     P-R Int : 172 ms          QRS Dur : 084 ms      QT Int : 386 ms       P-R-T Axes : 076 006 079 degrees     QTc Int : 459 ms    Normal sinus rhythm  Normal ECG  When compared with ECG of 21-DEC-2012 23:19,  Significant changes have occurred  Confirmed by Simone Randlal MD ([id number]) on 9/12/2023 5:53:19 PM    Referred By: [email or web address]   SELF           Confirmed By:Simone Randall MD                                     EKG 12-LEAD (Final result)  Result time 09/13/23 17:31:39      Final result by Unknown User (09/13/23 17:31:39)                                      Imaging Results              X-Ray Chest AP Portable (Final result)  Result time 09/12/23 12:05:40      Final result by Erwin Vega III, MD (09/12/23 12:05:40)                   Impression:      No acute process seen.      Electronically signed by: Erwin Vega MD  Date:    09/12/2023  Time:    12:05               Narrative:    EXAMINATION:  XR CHEST AP PORTABLE    CLINICAL HISTORY:  Chest Pain;    FINDINGS:  Chest one view:    Heart size is normal.  There is aortic plaque.  Lungs are clear and the bones are noncontributory.                                       Medications   aluminum-magnesium hydroxide-simethicone 200-200-20 mg/5 mL suspension 30 mL (30 mLs Oral Given 9/12/23 1256)   acetaminophen tablet 1,000 mg (1,000 mg Oral Given 9/12/23 1314)     Medical  Decision Making  Amount and/or Complexity of Data Reviewed  Labs: ordered.  Radiology: ordered.    Risk  OTC drugs.  Prescription drug management.      MDM:    58-year-old female with past medical history as noted above presenting with chest pain.  Physical exam as noted above.  ED workup notable for D-dimer 0.30, CBC within normal limits, CMP with creatinine 1.0, troponin negative, BNP 11, lipase 50, chest x-ray unremarkable, EKG shows normal sinus rhythm rate of 85 beats per minute, normal-appearing EKG which appears change when compared to her prior from 2012,  MS, no STEMI.  Patient presentation consistent with chest pain, suspected be GERD with some slight improvement after Maalox, negative cardiac workup despite ongoing mild symptomatology.  Nevertheless given her risk factors will have patient follow-up outpatient for further evaluation and management. At this time given patient's history, physical exam, and ED workup do not suspect arrhythmia, MI/ACS, PE, PTX, aortic dissection, pericarditis, PNA, shingles, or any further malignant cause.  Discussed diagnosis and further treatment with patient including f/u, return precautions given and all questions answered.  Patient in understanding of plan.  Pt discharged to home improved and stable.            Scribe Attestation:   Scribe #1: I performed the above scribed service and the documentation accurately describes the services I performed. I attest to the accuracy of the note.                      Scribe attestation: I, Manuel Ocampo M.D., personally performed the services described in this documentation. All medical record entries made by the scribe were at my direction and in my presence.  I have reviewed the chart and agree that the record reflects my personal performance and is accurate and complete.    Clinical Impression:   Final diagnoses:  [R07.9] Chest pain (Primary)        ED Disposition Condition    Discharge Stable          ED  Prescriptions       Medication Sig Dispense Start Date End Date Auth. Provider    famotidine (PEPCID) 20 MG tablet Take 2 tablets (40 mg total) by mouth 2 (two) times daily. 20 tablet 9/12/2023 9/11/2024 Manuel Ocampo MD    sucralfate (CARAFATE) 1 gram tablet Take 1 tablet (1 g total) by mouth 4 (four) times daily. for 14 days 56 tablet 9/12/2023 9/26/2023 Manuel Ocampo MD          Follow-up Information       Follow up With Specialties Details Why Contact Info    [facility] Emergency Dept Emergency Medicine Go to  [facility] symptoms worsen [address] [facility] [address] [phone]    Gio Barroso MD Internal Medicine Go in 1 week As needed [address] [facility] [address] [phone]      Simone Randall MD Cardiology, Interventional Cardiology Schedule an appointment as soon as possible for a visit   [address]  [address]  [facility] [id number] [phone]      Warren Lawson MD Interventional Cardiology, Cardiology   [address] [place] [id number] [phone]               Manuel Ocampo MD  09/14/23 5626

## 2023-12-01 ENCOUNTER — HOSPITAL ENCOUNTER (EMERGENCY)
Facility: HOSPITAL | Age: 58
Discharge: HOME | End: 2023-12-01
Attending: EMERGENCY MEDICINE
Payer: MEDICAID

## 2023-12-01 VITALS
OXYGEN SATURATION: 97 % | BODY MASS INDEX: 36.19 KG/M2 | TEMPERATURE: 98 F | SYSTOLIC BLOOD PRESSURE: 157 MMHG | HEIGHT: 64 IN | DIASTOLIC BLOOD PRESSURE: 93 MMHG | WEIGHT: 212 LBS | RESPIRATION RATE: 20 BRPM | HEART RATE: 85 BPM

## 2023-12-01 DIAGNOSIS — J20.9 ACUTE BRONCHITIS, UNSPECIFIED ORGANISM: Primary | ICD-10-CM

## 2023-12-01 DIAGNOSIS — R05.9 COUGH: ICD-10-CM

## 2023-12-01 LAB
CTP QC/QA: YES
INFLUENZA A ANTIGEN, POC: NEGATIVE
INFLUENZA B ANTIGEN, POC: NEGATIVE
POC RAPID STREP A: NEGATIVE
POCT GLUCOSE: 105 MG/DL (ref 70–110)
SARS-COV-2 RDRP RESP QL NAA+PROBE: NEGATIVE

## 2023-12-01 PROCEDURE — 87804 INFLUENZA ASSAY W/OPTIC: CPT | Mod: 59,ER

## 2023-12-01 PROCEDURE — 63600175 PHARM REV CODE 636 W HCPCS: Mod: ER | Performed by: NURSE PRACTITIONER

## 2023-12-01 PROCEDURE — 87635 SARS-COV-2 COVID-19 AMP PRB: CPT | Mod: ER | Performed by: EMERGENCY MEDICINE

## 2023-12-01 PROCEDURE — 99284 EMERGENCY DEPT VISIT MOD MDM: CPT | Mod: 25,ER

## 2023-12-01 PROCEDURE — 82962 GLUCOSE BLOOD TEST: CPT | Mod: ER

## 2023-12-01 RX ORDER — GUAIFENESIN 600 MG/1
1200 TABLET, EXTENDED RELEASE ORAL 2 TIMES DAILY PRN
Qty: 40 TABLET | Refills: 0 | Status: SHIPPED | OUTPATIENT
Start: 2023-12-01 | End: 2023-12-11

## 2023-12-01 RX ORDER — PREDNISONE 20 MG/1
40 TABLET ORAL DAILY
Qty: 8 TABLET | Refills: 0 | Status: SHIPPED | OUTPATIENT
Start: 2023-12-02 | End: 2023-12-06

## 2023-12-01 RX ORDER — ALBUTEROL SULFATE 90 UG/1
1-2 AEROSOL, METERED RESPIRATORY (INHALATION) EVERY 6 HOURS PRN
Qty: 8 G | Refills: 0 | Status: SHIPPED | OUTPATIENT
Start: 2023-12-01 | End: 2024-11-30

## 2023-12-01 RX ORDER — BENZONATATE 100 MG/1
100 CAPSULE ORAL 3 TIMES DAILY PRN
Qty: 20 CAPSULE | Refills: 0 | Status: SHIPPED | OUTPATIENT
Start: 2023-12-01 | End: 2023-12-11

## 2023-12-01 RX ADMIN — PREDNISONE 50 MG: 5 TABLET ORAL at 04:12

## 2023-12-01 NOTE — ED PROVIDER NOTES
Encounter Date: 2023    SCRIBE #1 NOTE: I, Shlomo Ortiz, am scribing for, and in the presence of,  Jessenia Dueñas NP.       History     Chief Complaint   Patient presents with    Sore Throat    Cough     Sore throat and cough, increasing since Saturday, seen by MD and taking  Rx, not getting better     CC: Sore throat    HPI: Kenna Robert is a 58 y.o. female with a PMHx of HTN, HLD, DM, who presents to the ED for evaluation of sore throat beginning Saturday. Patient reports complaints of sore throat and  productive cough with yellow sputum for 8 days, noting she was seen by her doctor on Thursday and was told to return if he complaints did not improve, but states their office is closed so she opted for the ED instead. She notes she has been taking Z-pack, flonase, and cough syrup with no immediate relief noted. Endorses possible sick contact. No other medications taken PTA. No alleviating or exacerbating factors noted. Denies CP, SOB, fever, chills, or other associated symptoms.     The history is provided by the patient. No  was used.     Review of patient's allergies indicates:   Allergen Reactions    Lisinopril Other (See Comments)     Bad cough  Unspecified    Statins-hmg-coa reductase inhibitors Other (See Comments)     Muscle aches  unspecified     Past Medical History:   Diagnosis Date    Hypercholesteremia     Hypertension     Thyroid disease      Past Surgical History:   Procedure Laterality Date    TUBAL LIGATION       No family history on file.  Social History     Tobacco Use    Smoking status: Some Days     Current packs/day: 0.00     Types: Cigarettes     Last attempt to quit: 2012     Years since quittin.0   Substance Use Topics    Alcohol use: No    Drug use: No     Review of Systems   Constitutional:  Negative for chills and fever.   HENT:  Positive for sore throat. Negative for trouble swallowing.    Eyes:  Negative for visual disturbance.   Respiratory:  Positive  for cough. Negative for shortness of breath.    Cardiovascular:  Negative for chest pain.   Gastrointestinal:  Negative for abdominal pain.   Genitourinary:  Negative for difficulty urinating.   Musculoskeletal:  Negative for joint swelling.   Skin:  Negative for color change.   Neurological:  Negative for dizziness and headaches.       Physical Exam     Initial Vitals [12/01/23 1246]   BP Pulse Resp Temp SpO2   (!) 165/97 81 20 97.7 °F (36.5 °C) 96 %      MAP       --         Physical Exam    Constitutional: She appears well-developed and well-nourished. She is not diaphoretic. No distress.   HENT:   Head: Normocephalic and atraumatic.   Right Ear: Hearing, tympanic membrane, external ear and ear canal normal.   Left Ear: Hearing, tympanic membrane, external ear and ear canal normal.   Nose: Mucosal edema and rhinorrhea present.   Mouth/Throat: Posterior oropharyngeal erythema present. No oropharyngeal exudate.   Eyes: Conjunctivae and EOM are normal. Pupils are equal, round, and reactive to light. Right eye exhibits no discharge. Left eye exhibits no discharge.   Neck: Neck supple.   Normal range of motion.  Cardiovascular:  Normal rate, regular rhythm, normal heart sounds and intact distal pulses.           Pulmonary/Chest: Breath sounds normal. No respiratory distress.   Abdominal: Abdomen is soft. Bowel sounds are normal. There is no abdominal tenderness. No hernia. There is no rigidity, no rebound, no guarding, no tenderness at McBurney's point and negative Holt's sign.   Musculoskeletal:         General: Normal range of motion.      Cervical back: Normal range of motion and neck supple.     Neurological: She is alert and oriented to person, place, and time.   Skin: Skin is warm and dry.   Psychiatric: She has a normal mood and affect. Her behavior is normal.         ED Course   Procedures  Labs Reviewed   SARS-COV-2 RDRP GENE   POCT STREP A MOLECULAR   POCT INFLUENZA A/B MOLECULAR   POCT STREP A, RAPID    POCT GLUCOSE   POCT RAPID INFLUENZA A/B          Imaging Results              X-Ray Chest AP Portable (Final result)  Result time 12/01/23 14:16:54      Final result by Tiffanie Saeed MD (12/01/23 14:16:54)                   Impression:      Clear lungs.      Electronically signed by: Tiffanie Saeed MD  Date:    12/01/2023  Time:    14:16               Narrative:    EXAMINATION:  XR CHEST AP PORTABLE    CLINICAL HISTORY:  Cough, unspecified    TECHNIQUE:  Single frontal view of the chest was performed.    COMPARISON:  Prior dated 09/12/2020    FINDINGS:  The mediastinal structures are midline.  The cardiac silhouette is not enlarged.  The lungs appear grossly clear.  No osseous abnormalities are identified.                                       Medications   predniSONE tablet 50 mg (50 mg Oral Given 12/1/23 1605)     Medical Decision Making  This is an evaluation of a 58 y.o. female that presents to the Emergency Department for sore throat, cough, rhinorrhea and nasal congestion. The patient is a non-toxic, afebrile, and well appearing female. On physical exam ears and pharynx are without evidence of infection. Appears well hydrated with moist mucus membranes. Neck soft and supple with no meningeal signs or cervical lymphadenopathy. Breath sounds are clear and equal bilaterally with no adventitious breath sounds, tachypnea or respiratory distress with room air pulse ox of 97% and no evidence of hypoxia.     Vital Signs Are Reassuring.  RESULTS:   COVID and flu negative.  Glucose 105.  Chest x-ray without consolidation concerning for pneumonia.    My overall impression is acute bronchitis. I considered, but at this time, do not suspect OM, OE, strep pharyngitis, meningitis, pneumonia, or acute bacterial sinusitis.    The diagnosis, treatment plan, instructions for follow-up and reevaluation with PCP as well as ED return precautions were discussed and understanding was verbalized. All questions or concerns  have been addressed.     Amount and/or Complexity of Data Reviewed  Labs: ordered. Decision-making details documented in ED Course.  Radiology: ordered. Decision-making details documented in ED Course.    Risk  OTC drugs.  Prescription drug management.            Scribe Attestation:   Scribe #1: I performed the above scribed service and the documentation accurately describes the services I performed. I attest to the accuracy of the note.        ED Course as of 12/01/23 1712   Fri Dec 01, 2023   1420 X-Ray Chest AP Portable  FINDINGS:  The mediastinal structures are midline.  The cardiac silhouette is not enlarged.  The lungs appear grossly clear.  No osseous abnormalities are identified.     Impression:     Clear lungs.      [MT]   1420 POC Rapid Strep A: negative [MT]      ED Course User Index  [MT] Jessenia Dueñas, YARELI METZGER, personally performed the services described in this documentation. All medical record entries made by the scribe were at my direction and in my presence. I have reviewed the chart and agree that the record reflects my personal performance and is accurate and complete.      Clinical Impression:  Final diagnoses:  [R05.9] Cough  [J20.9] Acute bronchitis, unspecified organism (Primary)          ED Disposition Condition    Discharge Stable          ED Prescriptions       Medication Sig Dispense Start Date End Date Auth. Provider    albuterol (PROVENTIL/VENTOLIN HFA) 90 mcg/actuation inhaler Inhale 1-2 puffs into the lungs every 6 (six) hours as needed for Wheezing or Shortness of Breath. Rescue 8 g 12/1/2023 11/30/2024 Jessenia Dueñas NP    predniSONE (DELTASONE) 20 MG tablet Take 2 tablets (40 mg total) by mouth once daily. for 4 days 8 tablet 12/2/2023 12/6/2023 Jessenia Dueñas NP    guaiFENesin (MUCINEX) 600 mg 12 hr tablet Take 2 tablets (1,200 mg total) by mouth 2 (two) times daily as needed for Congestion. 40 tablet 12/1/2023 12/11/2023 Jessenia Dueñas  E., NP    benzonatate (TESSALON) 100 MG capsule Take 1 capsule (100 mg total) by mouth 3 (three) times daily as needed for Cough. 20 capsule 12/1/2023 12/11/2023 Jessenia Dueñas NP          Follow-up Information       Follow up With Specialties Details Why Contact Info    Gio Elizabeth MD Family Medicine Schedule an appointment as soon as possible for a visit  For follow-up [address] [facility] [address]  [phone]      [facility] ED Emergency Medicine Go to  If symptoms worsen [address] [facility] [id number] [phone]             Jessenia Dueñas NP  12/01/23 8428

## 2023-12-01 NOTE — DISCHARGE INSTRUCTIONS
Your strep, COVID and flu tests are negative.    Thank you for coming to our Emergency Department today. It is important to remember that some problems or medical conditions are difficult to diagnose and may not be found during your Emergency Department visit.     Be sure to follow up with your primary care doctor and review all labs/imaging/tests that were performed during your ER visit with them. Some labs/tests may be outside of the normal range and require non-emergent follow-up and further investigation to help diagnose/exclude/prevent complications or other potentially serious medical conditions that were not addressed during your ER visit.    If you do not have a primary care doctor, you may contact the one listed on your discharge paperwork or you may also call the [facility] Appointment Desk at [phone] to schedule an appointment and establish care with one. It is important to your health that you have a primary care doctor.    Please take all medications as directed. All medications may potentially have side-effects and it is impossible to predict which medications may give you side-effects or what side-effects (if any) they will give you.. If you feel that you are having a negative effect or side-effect of any medication you should immediately stop taking them and seek medical attention. If you feel that you are having a life-threatening reaction call 911.    Return to the ER with any questions/concerns, new/concerning symptoms, worsening or failure to improve.     Do not drive, swim, climb to height, take a bath, operate heavy machinery, drink alcohol or take potentially sedating medications, sign any legal documents or make any important decisions for 24 hours if you have received any pain medications, sedatives or mood altering drugs during your ER visit or within 24 hours of taking them if they have been prescribed to you.     You can find additional resources for Dentists, hearing aids,  durable medical equipment, low cost pharmacies and other resources at [email or web address]    BELOW THIS LINE ONLY APPLIES IF YOU HAVE A COVID TEST PENDING OR IF YOU HAVE BEEN DIAGNOSED WITH COVID:  Please access [facility] to review the results of your test. Until the results of your COVID test return, you should isolate yourself so as not to potentially spread illness to others.   If your COVID test returns positive, you should isolate yourself so as not to spread illness to others. After five full days, if you are feeling better and you have not had fever for 24 hours, you can return to your typical daily activities, but you must wear a mask around others for an additional 5 days.   If your COVID test returns negative and you are either unvaccinated or more than six months out from your two-dose vaccine and are not yet boosted, you should still quarantine for 5 full days followed by strict mask use for an additional 5 full days.   If your COVID test returns negative and you have received your 2-dose initial vaccine as well as a booster, you should continue strict mask use for 10 full days after the exposure.  For all those exposed, best practice includes a test at day 5 after the exposure. This can be a home test or a test through one of the many testing centers throughout our community.   Masking is always advised to limit the spread of COVID. Cdc.gov is an excellent site to obtain the latest up to date recommendations regarding COVID and COVID testing.     CDC Testing and Quarantine Guidelines for patients with exposure to a known-positive COVID-19 person:  A close exposure is defined as anyone who has had an exposure (masked or unmasked) to a known COVID -19 positive person within 6 feet of someone for a cumulative total of 15 minutes or more over a 24-hour period.   Vaccinated and/or if you recently had a positive covid test within 90 days do NOT need to quarantine after contact with someone who had  COVID-19 unless you develop symptoms.   Fully vaccinated people who have not had a positive test within 90 days, should get tested 3-5 days after their exposure, even if they don't have symptoms and wear a mask indoors in public for 14 days following exposure or until their test result is negative.      Unvaccinated and/or NOT had a positive test within 90 days and meet close exposure  You are required by CDC guidelines to quarantine for at least 5 days from time of exposure followed by 5 days of strict masking. It is recommended, but not required to test after 5 days, unless you develop symptoms, in which case you should test at that time.  If you get tested after 5 days and your test is positive, your 5 day period of isolation starts the day of the positive test.    If your exposure does not meet the above definition, you can return to your normal daily activities to include social distancing, wearing a mask and frequent handwashing.      Here is a link to guidance from the CDC:  https://www.cdc.gov/media/releases/2021/s1227-isolation-quarantine-guidance.html      [facility] Testing Sites:  https://ldh.la.gov/page/3934      [facility] website with testing locations and guidance:  [email or web address]

## 2024-11-16 ENCOUNTER — HOSPITAL ENCOUNTER (EMERGENCY)
Facility: HOSPITAL | Age: 59
Discharge: HOME | End: 2024-11-16
Attending: EMERGENCY MEDICINE
Payer: MEDICAID

## 2024-11-16 VITALS
RESPIRATION RATE: 18 BRPM | OXYGEN SATURATION: 98 % | HEIGHT: 64 IN | DIASTOLIC BLOOD PRESSURE: 74 MMHG | SYSTOLIC BLOOD PRESSURE: 140 MMHG | TEMPERATURE: 98 F | WEIGHT: 206 LBS | HEART RATE: 88 BPM | BODY MASS INDEX: 35.17 KG/M2

## 2024-11-16 DIAGNOSIS — N39.0 ACUTE UTI: Primary | ICD-10-CM

## 2024-11-16 PROBLEM — E78.5 HYPERLIPIDEMIA: Status: ACTIVE | Noted: 2024-11-16

## 2024-11-16 PROBLEM — I10 ESSENTIAL HYPERTENSION: Status: ACTIVE | Noted: 2024-11-16

## 2024-11-16 LAB
BILIRUBIN, POC UA: NEGATIVE
BLOOD, POC UA: ABNORMAL
CLARITY, UA: CLEAR
COLOR, UA: YELLOW
GLUCOSE, POC UA: NEGATIVE
KETONES, POC UA: NEGATIVE
LEUKOCYTE EST, POC UA: ABNORMAL
NITRITE, POC UA: NEGATIVE
PH UR STRIP: 6 [PH] (ref 5–8)
POCT GLUCOSE: 137 MG/DL (ref 70–110)
PROTEIN, POC UA: NEGATIVE
SPECIFIC GRAVITY, POC UA: 1.01 (ref 1–1.03)
UROBILINOGEN, POC UA: 0.2 E.U./DL

## 2024-11-16 PROCEDURE — 82962 GLUCOSE BLOOD TEST: CPT | Mod: ER

## 2024-11-16 PROCEDURE — 87088 URINE BACTERIA CULTURE: CPT | Performed by: EMERGENCY MEDICINE

## 2024-11-16 PROCEDURE — 99284 EMERGENCY DEPT VISIT MOD MDM: CPT | Mod: ER

## 2024-11-16 PROCEDURE — 87086 URINE CULTURE/COLONY COUNT: CPT | Performed by: EMERGENCY MEDICINE

## 2024-11-16 PROCEDURE — 25000003 PHARM REV CODE 250: Mod: ER | Performed by: EMERGENCY MEDICINE

## 2024-11-16 PROCEDURE — 87186 SC STD MICRODIL/AGAR DIL: CPT | Performed by: EMERGENCY MEDICINE

## 2024-11-16 RX ORDER — ACETAMINOPHEN 500 MG
500 TABLET ORAL EVERY 6 HOURS PRN
Qty: 30 TABLET | Refills: 0 | Status: SHIPPED | OUTPATIENT
Start: 2024-11-16

## 2024-11-16 RX ORDER — NITROFURANTOIN 25; 75 MG/1; MG/1
100 CAPSULE ORAL 2 TIMES DAILY
Qty: 10 CAPSULE | Refills: 0 | Status: SHIPPED | OUTPATIENT
Start: 2024-11-16 | End: 2024-11-19 | Stop reason: ALTCHOICE

## 2024-11-16 RX ORDER — PHENAZOPYRIDINE HYDROCHLORIDE 200 MG/1
200 TABLET, FILM COATED ORAL 3 TIMES DAILY PRN
Qty: 6 TABLET | Refills: 0 | Status: SHIPPED | OUTPATIENT
Start: 2024-11-16 | End: 2024-11-26

## 2024-11-16 RX ORDER — ONDANSETRON 8 MG/1
8 TABLET, ORALLY DISINTEGRATING ORAL EVERY 6 HOURS PRN
Qty: 8 TABLET | Refills: 0 | Status: SHIPPED | OUTPATIENT
Start: 2024-11-16 | End: 2024-11-18

## 2024-11-16 RX ORDER — PHENAZOPYRIDINE HYDROCHLORIDE 100 MG/1
200 TABLET, FILM COATED ORAL
Status: COMPLETED | OUTPATIENT
Start: 2024-11-16 | End: 2024-11-16

## 2024-11-16 RX ORDER — IBUPROFEN 600 MG/1
600 TABLET ORAL EVERY 6 HOURS PRN
Qty: 20 TABLET | Refills: 0 | Status: SHIPPED | OUTPATIENT
Start: 2024-11-16

## 2024-11-16 RX ADMIN — PHENAZOPYRIDINE HYDROCHLORIDE 200 MG: 100 TABLET ORAL at 12:11

## 2024-11-16 NOTE — DISCHARGE INSTRUCTIONS
Please drink at least 8 8 oz glasses of water a day.  I also recommend drinking a quarter cup of cranberry juice twice a day to help clear urinary tract infection.  Please return to the ED if your symptoms do not worsen or improve.  Your urine culture is pending.  We will call you if we need to switch antibiotic therapy.

## 2024-11-16 NOTE — ED NOTES
Kenna Robert, a 59 y.o. female presents to the ED w/ complaint of burning and urinary frequency x 3 days.  Hx of DMII    Chief Complaint   Patient presents with    Urinary Tract Infection     Burning, urgency, frequency, onset thursday     Review of patient's allergies indicates:   Allergen Reactions    Lisinopril Other (See Comments)     Bad cough  Unspecified    Statins-hmg-coa reductase inhibitors Other (See Comments)     Muscle aches  unspecified     Past Medical History:   Diagnosis Date    Hypercholesteremia     Hypertension     Thyroid disease

## 2024-11-16 NOTE — ED PROVIDER NOTES
Encounter Date: 2024    SCRIBE #1 NOTE: ICarlosjill Dillon, am scribing for, and in the presence of,  Elif Campos DO.   SCRIBE #2 NOTE: I, Milla Jayde, am scribing for, and in the presence of,  Elif Campos DO. I have scribed the remaining portions of the note not scribed by Scribe #1.     History     Chief Complaint   Patient presents with    Urinary Tract Infection     Burning, urgency, frequency, onset thursday     Kenna Robert is a 59 y.o. female with Hx of HLD, HTN who presents to the ED for chief complaint of UTI symptoms for past 2 days. Patient reports dysuria, urinary urgency, and frequency. She denies any attempted treatment. She denies fevers, chills, back pain, hematuria, nausea, vomiting or abdominal pain. No known allergies to antibiotics.      The history is provided by the patient. No  was used.     Review of patient's allergies indicates:   Allergen Reactions    Lisinopril Other (See Comments)     Bad cough  Unspecified    Statins-hmg-coa reductase inhibitors Other (See Comments)     Muscle aches  unspecified     Past Medical History:   Diagnosis Date    Hypercholesteremia     Hypertension     Thyroid disease      Past Surgical History:   Procedure Laterality Date    TUBAL LIGATION       No family history on file.  Social History     Tobacco Use    Smoking status: Some Days     Current packs/day: 0.00     Types: Cigarettes     Last attempt to quit: 2012     Years since quittin.0   Substance Use Topics    Alcohol use: No    Drug use: No     Review of Systems   Constitutional:  Negative for chills and fever.   HENT:  Negative for rhinorrhea and sore throat.    Eyes:  Negative for redness.   Respiratory:  Negative for shortness of breath.    Cardiovascular:  Negative for chest pain and leg swelling.   Gastrointestinal:  Negative for abdominal pain, diarrhea, nausea and vomiting.   Genitourinary:  Positive for dysuria, frequency and urgency. Negative for  hematuria.   Musculoskeletal:  Negative for back pain.   Skin:  Negative for rash.   Neurological:  Negative for syncope and headaches.   All other systems reviewed and are negative.      Physical Exam     Initial Vitals [11/16/24 1152]   BP Pulse Resp Temp SpO2   139/77 88 19 97.9 °F (36.6 °C) 98 %      MAP       --         Physical Exam    Nursing note and vitals reviewed.  Constitutional: She appears well-developed and well-nourished.   HENT:   Head: Normocephalic and atraumatic.   Right Ear: External ear normal.   Left Ear: External ear normal.   Eyes: Conjunctivae and EOM are normal. Pupils are equal, round, and reactive to light. Right eye exhibits no discharge. Left eye exhibits no discharge.   Neck: Neck supple.   Normal range of motion.  Cardiovascular:  Normal rate, regular rhythm, normal heart sounds and intact distal pulses.     Exam reveals no gallop and no friction rub.       No murmur heard.  Pulmonary/Chest: Breath sounds normal. No respiratory distress. She has no wheezes. She has no rhonchi. She has no rales. She exhibits no tenderness.   Abdominal: Abdomen is soft. Bowel sounds are normal. She exhibits no distension and no mass. There is no abdominal tenderness.   No CVA tenderness There is no rebound and no guarding.   Musculoskeletal:         General: No tenderness or edema. Normal range of motion.      Cervical back: Normal range of motion and neck supple.     Neurological: She is alert and oriented to person, place, and time.   Skin: Skin is warm and dry.   Psychiatric: She has a normal mood and affect.         ED Course   Procedures  Labs Reviewed   POCT GLUCOSE - Abnormal       Result Value    POCT Glucose 137 (*)    POCT URINALYSIS W/O SCOPE - Abnormal    Glucose, UA Negative      Bilirubin, UA Negative      Ketones, UA Negative      Spec Grav UA 1.015      Blood, UA 1+ (*)     PH, UA 6.0      Protein, UA Negative      Urobilinogen, UA 0.2      Nitrite, UA Negative      Leukocytes, UA 1+  (*)     Color, UA POC Yellow      Clarity, UA, POC Clear     CULTURE, URINE   POCT GLUCOSE, HAND-HELD DEVICE   POCT URINALYSIS W/O SCOPE          Imaging Results    None          Medications   phenazopyridine tablet 200 mg (200 mg Oral Given 11/16/24 1245)     Medical Decision Making  Amount and/or Complexity of Data Reviewed  Labs: ordered. Decision-making details documented in ED Course.    Risk  Prescription drug management.    Medical Decision Making:    This is an evaluation of a 59 y.o. female that presents to the Emergency Department for   Chief Complaint   Patient presents with    Urinary Tract Infection     Burning, urgency, frequency, onset thursday       The patient is a non-toxic and well appearing patient. On physical exam, patient appears well hydrated with moist mucus membranes. Breath sounds are clear and equal bilaterally with no adventitious breath sounds, tachypnea or respiratory distress. Regular rate and rhythm. No murmurs. Abdomen soft and non tender. Patient is tolerating PO without difficulty. Physical exam otherwise as above.     I have reviewed vital signs and nursing notes.   Vital Signs Are Reassuring.     Based on the patient's symptoms, I am considering and evaluating for the following differential diagnoses: dysuria, hematuria, UTI, hyperglycemia    ED Course:Treatment in the ED included Physical Exam and medications given in ED  Medications   phenazopyridine tablet 200 mg (200 mg Oral Given 11/16/24 1245)   .   Patient reports feeling better after treatment in the ER.   Vital signs reviewed  Nurse's notes reviewed  External Data/Documents Reviewed: Previous medical records and vital signs reviewed, see HPI and Physical exam.   Labs: ordered and reviewed.  UA positive for leukocytes.  Urine culture pending.      Risk  Diagnosis or treatment significantly limited by the following social determinants of health: Body mass index is 35.36 kg/m².     In shared decision making with the  patient, we discussed treatment, prescriptions, labs, and imaging results.    Discharge home with   ED Prescriptions       Medication Sig Dispense Start Date End Date Auth. Provider    acetaminophen (TYLENOL) 500 MG tablet Take 1 tablet (500 mg total) by mouth every 6 (six) hours as needed for Pain (As needed for pain and fever). 30 tablet 11/16/2024 -- Elif Campos DO    ibuprofen (ADVIL,MOTRIN) 600 MG tablet Take 1 tablet (600 mg total) by mouth every 6 (six) hours as needed for Pain (Take with food as needed for mild-to-moderate pain). 20 tablet 11/16/2024 -- Elif Campos DO    phenazopyridine (PYRIDIUM) 200 MG tablet Take 1 tablet (200 mg total) by mouth 3 (three) times daily as needed for Pain (As needed for pain and discomfort with urination). 6 tablet 11/16/2024 11/26/2024 Elif Campos DO    nitrofurantoin, macrocrystal-monohydrate, (MACROBID) 100 MG capsule Take 1 capsule (100 mg total) by mouth 2 (two) times daily. for 5 days 10 capsule 11/16/2024 11/21/2024 Elif Campos DO    ondansetron (ZOFRAN-ODT) 8 MG TbDL Take 1 tablet (8 mg total) by mouth every 6 (six) hours as needed (As needed for nausea and vomiting). 8 tablet 11/16/2024 11/18/2024 Elif Campos DO          Fill and take prescriptions as directed.  Return to the ED if symptoms worsen or do not resolve.   Answered questions and discussed discharge plan.    Patient reports resolution of symptoms and is ready for discharge.  Follow up with PCP/specialist in 1 day    The following labs and imaging were reviewed:      Admission on 11/16/2024   Component Date Value Ref Range Status    POCT Glucose 11/16/2024 137 (H)  70 - 110 mg/dL Final    Glucose, UA 11/16/2024 Negative  Negative Final    Bilirubin, UA 11/16/2024 Negative  Negative Final    Ketones, UA 11/16/2024 Negative  Negative Final    Spec Grav UA 11/16/2024 1.015  1.005 - 1.030 Final    Blood, UA 11/16/2024 1+ (A)  Negative Final    PH, UA 11/16/2024 6.0  5.0 - 8.0 Final    Protein, UA  11/16/2024 Negative  Negative Final    Urobilinogen, UA 11/16/2024 0.2  <=1.0 E.U./dL Final    Nitrite, UA 11/16/2024 Negative  Negative Final    Leukocytes, UA 11/16/2024 1+ (A)  Negative Final    Color, UA POC 11/16/2024 Yellow  Yellow, Straw, Erica Final    Clarity, UA, POC 11/16/2024 Clear  Clear Final        Imaging Results    None               Scribe Attestation:   Scribe #1: I performed the above scribed service and the documentation accurately describes the services I performed. I attest to the accuracy of the note.  Scribe #2: I performed the above scribed service and the documentation accurately describes the services I performed. I attest to the accuracy of the note.                            I, Dr. Elif Campos, personally performed the services described in this documentation. This document was produced by a scribe under my direction and in my presence. All medical record entries made by the scribe were at my direction and in my presence.  I have reviewed the chart and agree that the record reflects my personal performance and is accurate and complete. Elif Campos DO.     11/16/2024 1:04 PM      Clinical Impression:  Final diagnoses:  [N39.0] Acute UTI (Primary)          ED Disposition Condition    Discharge Stable          ED Prescriptions       Medication Sig Dispense Start Date End Date Auth. Provider    acetaminophen (TYLENOL) 500 MG tablet Take 1 tablet (500 mg total) by mouth every 6 (six) hours as needed for Pain (As needed for pain and fever). 30 tablet 11/16/2024 -- Elif Campos DO    ibuprofen (ADVIL,MOTRIN) 600 MG tablet Take 1 tablet (600 mg total) by mouth every 6 (six) hours as needed for Pain (Take with food as needed for mild-to-moderate pain). 20 tablet 11/16/2024 -- Elif Campos DO    phenazopyridine (PYRIDIUM) 200 MG tablet Take 1 tablet (200 mg total) by mouth 3 (three) times daily as needed for Pain (As needed for pain and discomfort with urination). 6 tablet  11/16/2024 11/26/2024 Elif Campos DO    nitrofurantoin, macrocrystal-monohydrate, (MACROBID) 100 MG capsule Take 1 capsule (100 mg total) by mouth 2 (two) times daily. for 5 days 10 capsule 11/16/2024 11/21/2024 Elif Campos DO    ondansetron (ZOFRAN-ODT) 8 MG TbDL Take 1 tablet (8 mg total) by mouth every 6 (six) hours as needed (As needed for nausea and vomiting). 8 tablet 11/16/2024 11/18/2024 Elif Campos DO          Follow-up Information       Follow up With Specialties Details Why Contact Info    Gio Elizabeth MD Family Medicine Schedule an appointment as soon as possible for a visit in 1 day  [address] [facility]  [place] [address]  [phone]      [facility] - Emergency Dept Emergency Medicine Go to  Please go to [facility] emergency department if symptoms worsen [address] [facility] [place] [id number] [phone]             Elif Campos DO  11/16/24 3115

## 2024-11-18 LAB — BACTERIA UR CULT: ABNORMAL

## 2024-11-19 ENCOUNTER — TELEPHONE (OUTPATIENT)
Dept: EMERGENCY MEDICINE | Facility: HOSPITAL | Age: 59
End: 2024-11-19
Payer: MEDICAID

## 2024-11-19 RX ORDER — CEPHALEXIN 500 MG/1
500 CAPSULE ORAL 2 TIMES DAILY
Qty: 10 CAPSULE | Refills: 0 | Status: SHIPPED | OUTPATIENT
Start: 2024-11-19 | End: 2024-11-24

## 2024-11-19 RX ORDER — ONDANSETRON 4 MG/1
4 TABLET, ORALLY DISINTEGRATING ORAL EVERY 6 HOURS PRN
Qty: 10 TABLET | Refills: 0 | Status: SHIPPED | OUTPATIENT
Start: 2024-11-19

## 2025-02-10 ENCOUNTER — HOSPITAL ENCOUNTER (EMERGENCY)
Facility: HOSPITAL | Age: 60
Discharge: HOME | End: 2025-02-10
Attending: EMERGENCY MEDICINE
Payer: MEDICAID

## 2025-02-10 VITALS
HEIGHT: 64 IN | WEIGHT: 206 LBS | SYSTOLIC BLOOD PRESSURE: 129 MMHG | BODY MASS INDEX: 35.17 KG/M2 | DIASTOLIC BLOOD PRESSURE: 85 MMHG | OXYGEN SATURATION: 99 % | TEMPERATURE: 98 F | HEART RATE: 96 BPM | RESPIRATION RATE: 20 BRPM

## 2025-02-10 DIAGNOSIS — M25.512 LEFT SHOULDER PAIN, UNSPECIFIED CHRONICITY: Primary | ICD-10-CM

## 2025-02-10 PROCEDURE — 63600175 PHARM REV CODE 636 W HCPCS: Mod: JZ,TB,ER

## 2025-02-10 PROCEDURE — 96372 THER/PROPH/DIAG INJ SC/IM: CPT

## 2025-02-10 PROCEDURE — 99284 EMERGENCY DEPT VISIT MOD MDM: CPT | Mod: 25,ER

## 2025-02-10 RX ORDER — NAPROXEN 500 MG/1
500 TABLET ORAL 2 TIMES DAILY
Qty: 30 TABLET | Refills: 0 | Status: SHIPPED | OUTPATIENT
Start: 2025-02-10

## 2025-02-10 RX ORDER — KETOROLAC TROMETHAMINE 30 MG/ML
15 INJECTION, SOLUTION INTRAMUSCULAR; INTRAVENOUS
Status: COMPLETED | OUTPATIENT
Start: 2025-02-10 | End: 2025-02-10

## 2025-02-10 RX ORDER — CYCLOBENZAPRINE HCL 10 MG
10 TABLET ORAL 3 TIMES DAILY PRN
Qty: 15 TABLET | Refills: 0 | Status: SHIPPED | OUTPATIENT
Start: 2025-02-10 | End: 2025-02-20

## 2025-02-10 RX ADMIN — KETOROLAC TROMETHAMINE 15 MG: 30 INJECTION, SOLUTION INTRAMUSCULAR at 05:02

## 2025-02-10 NOTE — ED PROVIDER NOTES
"Encounter Date: 2/10/2025    SCRIBE #1 NOTE: I, Edithamrik Barrett, am scribing for, and in the presence of,  Joyce Daugherty PA-C.       History     Chief Complaint   Patient presents with    Shoulder Pain     Pain to left shoulder starting a few days ago      59 y.o. female with a PMHx of HTN presents to the ED for evaluation of intermittent stabbing left shoulder pain that has been presents for "a few days". Pt reports that the pain is mostly located in the shoulder blade area. She states that the pain worsened today. Pt denies any numbness, tingling, recent injury, CP, SOB, rhinorrhea, vomiting, sore throat, or cough. She has attempted treatment with Tylenol but which did not provide any relief.    The history is provided by the patient. No  was used.     Review of patient's allergies indicates:   Allergen Reactions    Lisinopril Other (See Comments)     Bad cough  Unspecified    Statins-hmg-coa reductase inhibitors Other (See Comments)     Muscle aches  unspecified     Past Medical History:   Diagnosis Date    Hypercholesteremia     Hypertension     Thyroid disease      Past Surgical History:   Procedure Laterality Date    TUBAL LIGATION       No family history on file.  Social History     Tobacco Use    Smoking status: Some Days     Current packs/day: 0.00     Types: Cigarettes     Last attempt to quit: 2012     Years since quittin.2   Substance Use Topics    Alcohol use: No    Drug use: No     Review of Systems   HENT:  Negative for rhinorrhea and sore throat.    Respiratory:  Negative for cough and shortness of breath.    Cardiovascular:  Negative for chest pain.   Gastrointestinal:  Negative for vomiting.   Musculoskeletal:  Positive for arthralgias (left shoulder).   Neurological:  Negative for numbness.        (-) paresthesia       Physical Exam     Initial Vitals [02/10/25 1530]   BP Pulse Resp Temp SpO2   133/87 102 20 97.9 °F (36.6 °C) 99 %      MAP       --         Physical " Exam    Nursing note and vitals reviewed.  Constitutional: She appears well-developed and well-nourished. She is not diaphoretic. No distress.   HENT:   Head: Normocephalic and atraumatic.   Right Ear: External ear normal.   Left Ear: External ear normal.   Eyes: Conjunctivae and EOM are normal. Pupils are equal, round, and reactive to light.   Neck: Neck supple.   Normal range of motion.  Cardiovascular:  Normal rate and regular rhythm.           Pulmonary/Chest: Breath sounds normal. No stridor. No respiratory distress.   Musculoskeletal:         General: Normal range of motion.      Right shoulder: Normal.      Left shoulder: Normal. No swelling, deformity, tenderness or bony tenderness. Normal range of motion. Normal strength.      Cervical back: Normal range of motion and neck supple.     Neurological: She is alert and oriented to person, place, and time. She has normal strength. No cranial nerve deficit or sensory deficit.   Skin: No rash noted.   Psychiatric: She has a normal mood and affect. Thought content normal.         ED Course   Procedures  Labs Reviewed - No data to display       Imaging Results              X-Ray Shoulder 2 or More Views Left (Final result)  Result time 02/10/25 16:39:59      Final result by Catracho Seaman MD (02/10/25 16:39:59)                   Impression:      No acute displaced fracture-dislocation identified.      Electronically signed by: Catracho Seaman MD  Date:    02/10/2025  Time:    16:39               Narrative:    EXAMINATION:  XR SHOULDER COMPLETE 2 OR MORE VIEWS LEFT    CLINICAL HISTORY:  shoulder pain;    TECHNIQUE:  Two or three views of the left shoulder were performed.    COMPARISON:  Chest radiograph 12/01/2025    FINDINGS:  Overall alignment is within normal limits. No displaced fracture, dislocation or destructive osseous process.  Age-related mild degenerative change about the shoulder.  Left lung apex is clear.  Calcification at the aortic arch.  No  "subcutaneous emphysema or radiopaque foreign body.                                       Medications   ketorolac injection 15 mg (15 mg Intramuscular Given 2/10/25 8034)     Medical Decision Making  59 y.o. female with a PMHx of HTN presents to the ED for evaluation of intermittent stabbing left shoulder pain that has been presents for "a few days".    Differential includes but not limited to shoulder fracture/dislocation however less likely due to lack of trauma, muscular strain, muscular spasm.    Patient is alert and afebrile.  Patient is nontoxic appearing, not in distress.  Vitals within normal limits.  On physical exam patient ambulating without difficulty.  Lungs are clear to auscultation.  Patient is speaking in full sentences without difficulty.  Normal heart rate and rhythm. No signs of fluid overload.  No leg edema.  Strong distal radial pulse bilaterally.  No midline spinal tenderness present.  Normal range of motion of extremities without difficulty.  Normal strength and sensation intact of upper extremities. Mild tenderness to palpation of area above the left scapula.  Shoulder x-ray negative for acute fracture or dislocation.  Patient given Toradol for pain.  Recommended patient taking Tylenol or ibuprofen for pain.  Recommended follow up PCP in 2 days.  Return precautions given for new or worsening symptoms such as but not limited to worsening pain, chest pain, shortness breath, or fever.  Patient is in agreeance to this plan, expresses understanding return precautions and follow up plan.  Vitals are reassuring.  Patient is stable for discharge at this time.      Amount and/or Complexity of Data Reviewed  Labs: ordered. Decision-making details documented in ED Course.    Risk  Prescription drug management.            Scribe Attestation:   Scribe #1: I performed the above scribed service and the documentation accurately describes the services I performed. I attest to the accuracy of the note.         "                   I, Joyce Daugherty PA-C, personally performed the services described in this documentation. All medical record entries made by the scribe were at my direction and in my presence. I have reviewed the chart and agree that the record reflects my personal performance and is accurate and complete.      DISCLAIMER: This note was prepared with [email or web address] voice recognition transcription software. Garbled syntax, mangled pronouns, and other bizarre constructions may be attributed to that software system.      Clinical Impression:  Final diagnoses:  [M25.512] Left shoulder pain, unspecified chronicity (Primary)          ED Disposition Condition    Discharge Stable          ED Prescriptions       Medication Sig Dispense Start Date End Date Auth. Provider    naproxen (NAPROSYN) 500 MG tablet Take 1 tablet (500 mg total) by mouth 2 (two) times daily. 30 tablet 2/10/2025 -- Joyce Daugherty PA-C    cyclobenzaprine (FLEXERIL) 10 MG tablet Take 1 tablet (10 mg total) by mouth 3 (three) times daily as needed for Muscle spasms. 15 tablet 2/10/2025 2/20/2025 Joyce Daugherty PA-C          Follow-up Information       Follow up With Specialties Details Why Contact Info    Gio Elizabeth MD Family Medicine Schedule an appointment as soon as possible for a visit in 2 days For follow up [address] [facility] [address]  [phone]      [facility] ED Emergency Medicine Go to  If new symptoms develop or symptoms worsen [address] [facility] [address] [phone]             Joyce Daugherty PA-C  02/10/25 7499

## 2025-02-10 NOTE — DISCHARGE INSTRUCTIONS
Thank you for coming to our Emergency Department today. It is important to remember that some problems or medical conditions are difficult to diagnose and may not be found or addressed during your Emergency Department visit.  These conditions often start with non-specific symptoms and can only be diagnosed on follow up visits with your primary care physician or specialist when the symptoms continue or change. Please remember that all medical conditions can change, and we cannot predict how you will be feeling tomorrow or the next day. Return to the ER with any questions/concerns, new/concerning symptoms, worsening or failure to improve.       Be sure to follow up with your primary care doctor and review all labs/imaging/tests that were performed during your ER visit with them. It is very common for us to identify non-emergent incidental findings which must be followed up with your primary care physician.  Some labs/imaging/tests may be outside of the normal range, and require non-emergent follow-up and/or further investigation/treatment/procedures/testing to help diagnose/exclude/prevent complications or other potentially serious medical conditions. Some abnormalities may not have been discussed or addressed during your ER visit.     An ER visit does not replace a primary care visit, and many screening tests or follow-up tests cannot be ordered by an ER doctor or performed by the ER. Some tests may even require pre-approval.    If you do not have a primary care doctor, you may contact the one listed on your discharge paperwork or you may also call the [facility] Appointment Desk at [phone] , or [address] [place] [address] at  [phone] to schedule an appointment, or establish care with a primary care doctor or even a specialist and to obtain information about local resources. It is important to your health that you have a primary care doctor.    Please take all medications as directed. We have done our best to select  a medication for you that will treat your condition however, all medications may potentially have side-effects and it is impossible to predict which medications may give you side-effects or what those side-effects (if any) those medications may give you.  If you feel that you are having a negative effect or side-effect of any medication you should stop taking those medications immediately and seek medical attention. If you feel that you are having a life-threatening reaction call 911.        Do not drive, swim, climb to height, take a bath, operate heavy machinery, drink alcohol or take potentially sedating medications, sign any legal documents or make any important decisions for 24 hours if you have received any pain medications, sedatives or mood altering drugs during your ER visit or within 24 hours of taking them if they have been prescribed to you.     You can find additional resources for Dentists, hearing aids, durable medical equipment, low cost pharmacies and other resources at [email or web address]

## 2025-02-11 NOTE — ED TRIAGE NOTES
Kenna Robert, a 59 y.o. female presents to the ED w/ complaint of pain to left shoulder that started a few days ago that is mainly in the shoulder blade area.  She denies any injury and has been taking Tylenol for pain with no relief.     Triage note:  Chief Complaint   Patient presents with    Shoulder Pain     Pain to left shoulder starting a few days ago      Review of patient's allergies indicates:   Allergen Reactions    Lisinopril Other (See Comments)     Bad cough  Unspecified    Statins-hmg-coa reductase inhibitors Other (See Comments)     Muscle aches  unspecified     Past Medical History:   Diagnosis Date    Hypercholesteremia     Hypertension     Thyroid disease

## 2025-03-09 ENCOUNTER — HOSPITAL ENCOUNTER (EMERGENCY)
Facility: HOSPITAL | Age: 60
Discharge: HOME | End: 2025-03-10
Attending: INTERNAL MEDICINE
Payer: MEDICAID

## 2025-03-09 DIAGNOSIS — R20.2 PARESTHESIA: ICD-10-CM

## 2025-03-09 DIAGNOSIS — G44.209 TENSION HEADACHE: Primary | ICD-10-CM

## 2025-03-09 DIAGNOSIS — I10 HTN (HYPERTENSION): ICD-10-CM

## 2025-03-09 LAB
ALBUMIN SERPL BCP-MCNC: 3.7 G/DL (ref 3.5–5.2)
ALP SERPL-CCNC: 75 U/L (ref 40–150)
ALT SERPL W/O P-5'-P-CCNC: 27 U/L (ref 10–44)
ANION GAP SERPL CALC-SCNC: 10 MMOL/L (ref 8–16)
AST SERPL-CCNC: 30 U/L (ref 10–40)
BASOPHILS # BLD AUTO: 0.07 K/UL (ref 0–0.2)
BASOPHILS NFR BLD: 1 % (ref 0–1.9)
BILIRUB SERPL-MCNC: 0.3 MG/DL (ref 0.1–1)
BUN SERPL-MCNC: 18 MG/DL (ref 6–20)
CALCIUM SERPL-MCNC: 10.2 MG/DL (ref 8.7–10.5)
CHLORIDE SERPL-SCNC: 109 MMOL/L (ref 95–110)
CO2 SERPL-SCNC: 23 MMOL/L (ref 23–29)
CREAT SERPL-MCNC: 0.9 MG/DL (ref 0.5–1.4)
DIFFERENTIAL METHOD BLD: ABNORMAL
EOSINOPHIL # BLD AUTO: 0.3 K/UL (ref 0–0.5)
EOSINOPHIL NFR BLD: 4 % (ref 0–8)
ERYTHROCYTE [DISTWIDTH] IN BLOOD BY AUTOMATED COUNT: 13 % (ref 11.5–14.5)
EST. GFR  (NO RACE VARIABLE): >60 ML/MIN/1.73 M^2
GLUCOSE SERPL-MCNC: 114 MG/DL (ref 70–110)
HCT VFR BLD AUTO: 35.7 % (ref 37–48.5)
HGB BLD-MCNC: 11.9 G/DL (ref 12–16)
IMM GRANULOCYTES # BLD AUTO: 0.04 K/UL (ref 0–0.04)
IMM GRANULOCYTES NFR BLD AUTO: 0.6 % (ref 0–0.5)
LYMPHOCYTES # BLD AUTO: 3.4 K/UL (ref 1–4.8)
LYMPHOCYTES NFR BLD: 47.3 % (ref 18–48)
MCH RBC QN AUTO: 30.6 PG (ref 27–31)
MCHC RBC AUTO-ENTMCNC: 33.3 G/DL (ref 32–36)
MCV RBC AUTO: 92 FL (ref 82–98)
MONOCYTES # BLD AUTO: 0.4 K/UL (ref 0.3–1)
MONOCYTES NFR BLD: 6.1 % (ref 4–15)
NEUTROPHILS # BLD AUTO: 3 K/UL (ref 1.8–7.7)
NEUTROPHILS NFR BLD: 41 % (ref 38–73)
NRBC BLD-RTO: 0 /100 WBC
PLATELET # BLD AUTO: 277 K/UL (ref 150–450)
PMV BLD AUTO: 9.6 FL (ref 9.2–12.9)
POTASSIUM SERPL-SCNC: 4.1 MMOL/L (ref 3.5–5.1)
PROT SERPL-MCNC: 7.5 G/DL (ref 6–8.4)
RBC # BLD AUTO: 3.89 M/UL (ref 4–5.4)
SODIUM SERPL-SCNC: 142 MMOL/L (ref 136–145)
WBC # BLD AUTO: 7.27 K/UL (ref 3.9–12.7)

## 2025-03-09 PROCEDURE — 63600175 PHARM REV CODE 636 W HCPCS: Mod: JZ,TB | Performed by: INTERNAL MEDICINE

## 2025-03-09 PROCEDURE — 99285 EMERGENCY DEPT VISIT HI MDM: CPT | Mod: 25

## 2025-03-09 PROCEDURE — 25000003 PHARM REV CODE 250: Performed by: INTERNAL MEDICINE

## 2025-03-09 PROCEDURE — 96374 THER/PROPH/DIAG INJ IV PUSH: CPT

## 2025-03-09 PROCEDURE — 85025 COMPLETE CBC W/AUTO DIFF WBC: CPT | Performed by: INTERNAL MEDICINE

## 2025-03-09 PROCEDURE — 93005 ELECTROCARDIOGRAM TRACING: CPT

## 2025-03-09 PROCEDURE — 80053 COMPREHEN METABOLIC PANEL: CPT | Performed by: INTERNAL MEDICINE

## 2025-03-09 PROCEDURE — 93010 ELECTROCARDIOGRAM REPORT: CPT | Mod: ,,, | Performed by: INTERNAL MEDICINE

## 2025-03-09 PROCEDURE — 96375 TX/PRO/DX INJ NEW DRUG ADDON: CPT

## 2025-03-09 RX ORDER — CLONIDINE HYDROCHLORIDE 0.1 MG/1
0.2 TABLET ORAL
Status: COMPLETED | OUTPATIENT
Start: 2025-03-09 | End: 2025-03-09

## 2025-03-09 RX ORDER — BUTALBITAL, ACETAMINOPHEN AND CAFFEINE 50; 325; 40 MG/1; MG/1; MG/1
2 TABLET ORAL
Status: COMPLETED | OUTPATIENT
Start: 2025-03-09 | End: 2025-03-09

## 2025-03-09 RX ORDER — BUTALBITAL, ACETAMINOPHEN AND CAFFEINE 50; 325; 40 MG/1; MG/1; MG/1
2 TABLET ORAL EVERY 8 HOURS PRN
Qty: 30 TABLET | Refills: 0 | Status: SHIPPED | OUTPATIENT
Start: 2025-03-09 | End: 2025-04-08

## 2025-03-09 RX ORDER — ONDANSETRON HYDROCHLORIDE 2 MG/ML
4 INJECTION, SOLUTION INTRAVENOUS
Status: COMPLETED | OUTPATIENT
Start: 2025-03-09 | End: 2025-03-09

## 2025-03-09 RX ORDER — HYDROMORPHONE HYDROCHLORIDE 1 MG/ML
1 INJECTION, SOLUTION INTRAMUSCULAR; INTRAVENOUS; SUBCUTANEOUS
Refills: 0 | Status: COMPLETED | OUTPATIENT
Start: 2025-03-09 | End: 2025-03-09

## 2025-03-09 RX ADMIN — BUTALBITAL, ACETAMINOPHEN, AND CAFFEINE 2 TABLET: 325; 50; 40 TABLET ORAL at 10:03

## 2025-03-09 RX ADMIN — HYDROMORPHONE HYDROCHLORIDE 1 MG: 1 INJECTION, SOLUTION INTRAMUSCULAR; INTRAVENOUS; SUBCUTANEOUS at 11:03

## 2025-03-09 RX ADMIN — SODIUM CHLORIDE 1000 ML: 9 INJECTION, SOLUTION INTRAVENOUS at 11:03

## 2025-03-09 RX ADMIN — ONDANSETRON 4 MG: 2 INJECTION INTRAMUSCULAR; INTRAVENOUS at 11:03

## 2025-03-09 RX ADMIN — CLONIDINE HYDROCHLORIDE 0.2 MG: 0.1 TABLET ORAL at 10:03

## 2025-03-10 VITALS
SYSTOLIC BLOOD PRESSURE: 114 MMHG | TEMPERATURE: 99 F | HEART RATE: 72 BPM | RESPIRATION RATE: 22 BRPM | HEIGHT: 64 IN | OXYGEN SATURATION: 94 % | BODY MASS INDEX: 35 KG/M2 | DIASTOLIC BLOOD PRESSURE: 58 MMHG | WEIGHT: 205 LBS

## 2025-03-10 NOTE — ED PROVIDER NOTES
Encounter Date: 3/9/2025       History     Chief Complaint   Patient presents with    Headache    Numbness     Pt w/ hx of HTN reports waxing and waning intermittent h/a x2 weeks. Pt denies hx of migraine. Pt also reports acute onset numbness that started around 2p. Pt saw chiropractic Wed, Thurs, Friday and adjustments were made to neck w/ no relief. Pt reports rotating Tylenol and Ibuprofen around the clock w/ no relief. Pt reports compliance with antihypertensives.      59-year-old female with a past medical hx of HTN reports waxing and waning intermittent h/a x2 weeks. Pt denies hx of migraine. Pt also reports acute onset numbness that started around 2p. Pt saw chiropractic Wed, Thurs, Friday and adjustments were made to neck w/ no relief. Pt reports rotating Tylenol and Ibuprofen around the clock w/ no relief. Pt reports compliance with antihypertensives.  Denies fever/chills/chest pain/shortness breath/focal weakness/loss of consciousness/slurred speech.        Review of patient's allergies indicates:   Allergen Reactions    Lisinopril Other (See Comments)     Bad cough  Unspecified    Statins-hmg-coa reductase inhibitors Other (See Comments)     Muscle aches  unspecified     Past Medical History:   Diagnosis Date    Hypercholesteremia     Hypertension     Thyroid disease      Past Surgical History:   Procedure Laterality Date    TUBAL LIGATION       No family history on file.  Social History[1]  Review of Systems   Constitutional:  Negative for chills and fever.   Respiratory:  Negative for shortness of breath.    Cardiovascular:  Negative for chest pain.   Gastrointestinal:  Negative for abdominal pain, nausea and vomiting.   Neurological:  Positive for numbness and headaches. Negative for dizziness, tremors, seizures, syncope, facial asymmetry, speech difficulty, weakness and light-headedness.   All other systems reviewed and are negative.      Physical Exam     Initial Vitals [03/09/25 2041]   BP Pulse  Resp Temp SpO2   (S) (!) 224/107 82 20 98.6 °F (37 °C) 99 %      MAP       --         Physical Exam    Nursing note and vitals reviewed.  Constitutional: She appears well-developed. She is not diaphoretic. No distress.   HENT:   Head: Normocephalic and atraumatic.   Right Ear: External ear normal.   Left Ear: External ear normal.   Nose: Nose normal. Mouth/Throat: Oropharynx is clear and moist.   Eyes: Conjunctivae and EOM are normal. Pupils are equal, round, and reactive to light.   Neck: Neck supple.   Normal range of motion.  Cardiovascular:  Normal rate, regular rhythm and normal heart sounds.           Pulmonary/Chest: Breath sounds normal. No respiratory distress. She has no wheezes. She has no rales.   Abdominal: Abdomen is soft. Bowel sounds are normal.   Musculoskeletal:         General: Normal range of motion.      Cervical back: Normal range of motion and neck supple.     Neurological: She is alert and oriented to person, place, and time. She has normal strength. No cranial nerve deficit or sensory deficit.   Skin: Skin is warm and dry. Capillary refill takes less than 2 seconds.   Psychiatric: She has a normal mood and affect. Thought content normal.         ED Course   Procedures  Labs Reviewed   CBC W/ AUTO DIFFERENTIAL - Abnormal       Result Value    WBC 7.27      RBC 3.89 (*)     Hemoglobin 11.9 (*)     Hematocrit 35.7 (*)     MCV 92      MCH 30.6      MCHC 33.3      RDW 13.0      Platelets 277      MPV 9.6      Immature Granulocytes 0.6 (*)     Gran # (ANC) 3.0      Immature Grans (Abs) 0.04      Lymph # 3.4      Mono # 0.4      Eos # 0.3      Baso # 0.07      nRBC 0      Gran % 41.0      Lymph % 47.3      Mono % 6.1      Eosinophil % 4.0      Basophil % 1.0      Differential Method Automated     COMPREHENSIVE METABOLIC PANEL - Abnormal    Sodium 142      Potassium 4.1      Chloride 109      CO2 23      Glucose 114 (*)     BUN 18      Creatinine 0.9      Calcium 10.2      Total Protein 7.5       Albumin 3.7      Total Bilirubin 0.3      Alkaline Phosphatase 75      AST 30      ALT 27      eGFR >60      Anion Gap 10            Imaging Results              CT Head Without Contrast (Final result)  Result time 03/09/25 22:16:36      Final result by Mohsen Rangel MD (03/09/25 22:16:36)                   Impression:      No acute intracranial process.      Electronically signed by: Mohsen Rangel  Date:    03/09/2025  Time:    22:16               Narrative:    EXAMINATION:  CT HEAD WITHOUT CONTRAST    CLINICAL HISTORY:  Headache, new or worsening (Age >= 50y);    TECHNIQUE:  Low dose axial CT images obtained throughout the head without intravenous contrast. Sagittal and coronal reconstructions were performed.    COMPARISON:  None.    FINDINGS:  Intracranial compartment:    Ventricles and sulci are normal in size for age without evidence of hydrocephalus. No extra-axial blood or fluid collections.    The brain parenchyma appears normal. No parenchymal mass, hemorrhage, edema or major vascular distribution infarct.    Skull/extracranial contents (limited evaluation): No fracture. Mastoid air cells and paranasal sinuses are essentially clear.                                       Medications   butalbital-acetaminophen-caffeine -40 mg per tablet 2 tablet (2 tablets Oral Given 3/9/25 2202)   cloNIDine tablet 0.2 mg (0.2 mg Oral Given 3/9/25 2202)   HYDROmorphone injection 1 mg (1 mg Intravenous Given 3/9/25 2349)   ondansetron injection 4 mg (4 mg Intravenous Given 3/9/25 2348)   sodium chloride 0.9% bolus 1,000 mL 1,000 mL (1,000 mLs Intravenous New Bag 3/9/25 8896)     Medical Decision Making  59-year-old female with a past medical hx of HTN reports waxing and waning intermittent h/a x2 weeks. Pt denies hx of migraine. Pt also reports acute onset numbness that started around 2p. Pt saw chiropractic Wed, Thurs, Friday and adjustments were made to neck w/ no relief. Pt reports rotating Tylenol and Ibuprofen  around the clock w/ no relief. Pt reports compliance with antihypertensives.  Denies fever/chills/chest pain/shortness breath/focal weakness/loss of consciousness/slurred speech.  Course of ED stay:   1. Cardiovascular-patient has been hemodynamically stable throughout ED stay except for elevated blood pressure.  She received clonidine and push pressure markedly improved.  She has been chest pain-free throughout ED stay.  2. Pulmonary-patient has had normal O2 sats on room air and no signs or symptoms of respiratory distress.    3. Hematology/infectious disease-patient has been afebrile.  CBC is reassuring.    4. GI/nutrition/renal/endocrine-CMP is reassuring.  5. Neuro-patient received Fioricet in the ED as well as Dilaudid, Zofran and normal saline bolus.  She reports resolution of headache and perioral numbness and was given instructions for hypertension/tension headache.  Prescription for Fioricet was given and the patient was advised to follow-up with her primary care physician within the next week for re-evaluation/return to the emergency department if condition worsens.    Amount and/or Complexity of Data Reviewed  Labs: ordered.  Radiology: ordered.    Risk  Prescription drug management.      Additional MDM:     NIH Stroke Scale:   Interval = baseline (upon arrival/admit)  Level of consciousness = 0 - alert  LOC questions = 0 - answers both correctly  LOC commands = 0 - performs both correctly  Best gaze = 0 - normal  Visual = 0 - no visual loss  Facial palsy = 0 - normal  Motor left arm =  0 - no drift  Motor right arm =  0 - no drift  Motor left leg = 0 - no drift  Motor right leg =  0 - no drift  Limb ataxia = 0 - absent  Sensory = 0 - normal  Best language = 0 - no aphasia  Dysarthria = 0 - normal articulation  Extinction and inattention = 0 - no neglect  NIH Stroke Scale Total = 0                                     Clinical Impression:  Final diagnoses:  [I10] HTN (hypertension)  [G44.209] Tension  headache (Primary)  [R20.2] Paresthesia          ED Disposition Condition    Discharge Stable          ED Prescriptions       Medication Sig Dispense Start Date End Date Auth. Provider    butalbital-acetaminophen-caffeine -40 mg (FIORICET, ESGIC) -40 mg per tablet Take 2 tablets by mouth every 8 (eight) hours as needed for Headaches. 30 tablet 3/9/2025 2025 Tom Abdul MD          Follow-up Information       Follow up With Specialties Details Why Contact Info    Gio Elizabeth MD Family Medicine Schedule an appointment as soon as possible for a visit in 1 week For reevaluation [address]  [place] [address]  [phone]                   [1]   Social History  Tobacco Use    Smoking status: Some Days     Current packs/day: 0.00     Types: Cigarettes     Last attempt to quit: 2012     Years since quittin.3   Substance Use Topics    Alcohol use: No    Drug use: No        Tom Abdul MD  03/10/25 0106

## 2025-03-10 NOTE — ED NOTES
Bed: 06main  Expected date:   Expected time:   Means of arrival: Personal Transportation  Comments:

## 2025-03-11 LAB
OHS QRS DURATION: 86 MS
OHS QTC CALCULATION: 456 MS

## 2025-03-24 ENCOUNTER — HOSPITAL ENCOUNTER (INPATIENT)
Facility: HOSPITAL | Age: 60
LOS: 4 days | Discharge: HOME | DRG: 417 | End: 2025-03-28
Attending: EMERGENCY MEDICINE | Admitting: STUDENT IN AN ORGANIZED HEALTH CARE EDUCATION/TRAINING PROGRAM
Payer: MEDICAID

## 2025-03-24 DIAGNOSIS — R07.9 CHEST PAIN: ICD-10-CM

## 2025-03-24 DIAGNOSIS — R74.8 ELEVATED LIPASE: ICD-10-CM

## 2025-03-24 DIAGNOSIS — R74.01 TRANSAMINITIS: ICD-10-CM

## 2025-03-24 DIAGNOSIS — K85.30 DRUG-INDUCED ACUTE PANCREATITIS, UNSPECIFIED COMPLICATION STATUS: Primary | ICD-10-CM

## 2025-03-24 DIAGNOSIS — R11.2 NAUSEA AND VOMITING: ICD-10-CM

## 2025-03-24 DIAGNOSIS — R10.9 INTRACTABLE ABDOMINAL PAIN: ICD-10-CM

## 2025-03-24 DIAGNOSIS — K80.20 CALCULUS OF GALLBLADDER WITHOUT CHOLECYSTITIS WITHOUT OBSTRUCTION: ICD-10-CM

## 2025-03-24 PROBLEM — E03.9 HYPOTHYROIDISM: Status: ACTIVE | Noted: 2025-03-24

## 2025-03-24 LAB
ABSOLUTE EOSINOPHIL (OHS): 0.25 K/UL
ABSOLUTE MONOCYTE (OHS): 0.49 K/UL (ref 0.3–1)
ABSOLUTE NEUTROPHIL COUNT (OHS): 3.38 K/UL (ref 1.8–7.7)
ALBUMIN SERPL BCP-MCNC: 4.1 G/DL (ref 3.5–5.2)
ALP SERPL-CCNC: 111 UNIT/L (ref 40–150)
ALT SERPL W/O P-5'-P-CCNC: 177 UNIT/L (ref 10–44)
ANION GAP (OHS): 9 MMOL/L (ref 8–16)
AST SERPL-CCNC: 212 UNIT/L (ref 11–45)
BACTERIA #/AREA URNS AUTO: ABNORMAL /HPF
BASOPHILS # BLD AUTO: 0.1 K/UL
BASOPHILS NFR BLD AUTO: 1.4 %
BILIRUB SERPL-MCNC: 0.4 MG/DL (ref 0.1–1)
BILIRUB UR QL STRIP.AUTO: NEGATIVE
BUN SERPL-MCNC: 15 MG/DL (ref 6–20)
CALCIUM SERPL-MCNC: 10 MG/DL (ref 8.7–10.5)
CAOX CRY UR QL COMP ASSIST: ABNORMAL
CHLORIDE SERPL-SCNC: 107 MMOL/L (ref 95–110)
CLARITY UR: CLEAR
CO2 SERPL-SCNC: 24 MMOL/L (ref 23–29)
COLOR UR AUTO: YELLOW
CREAT SERPL-MCNC: 0.9 MG/DL (ref 0.5–1.4)
ERYTHROCYTE [DISTWIDTH] IN BLOOD BY AUTOMATED COUNT: 13.1 % (ref 11.5–14.5)
GFR SERPLBLD CREATININE-BSD FMLA CKD-EPI: >60 ML/MIN/1.73/M2
GLUCOSE SERPL-MCNC: 139 MG/DL (ref 70–110)
GLUCOSE SERPL-MCNC: 142 MG/DL (ref 70–110)
GLUCOSE UR QL STRIP: ABNORMAL
HCT VFR BLD AUTO: 38.6 % (ref 37–48.5)
HGB BLD-MCNC: 13.1 GM/DL (ref 12–16)
HGB UR QL STRIP: NEGATIVE
HYALINE CASTS UR QL AUTO: 0 /LPF (ref 0–1)
IMM GRANULOCYTES # BLD AUTO: 0.02 K/UL (ref 0–0.04)
IMM GRANULOCYTES NFR BLD AUTO: 0.3 % (ref 0–0.5)
KETONES UR QL STRIP: NEGATIVE
LEUKOCYTE ESTERASE UR QL STRIP: ABNORMAL
LIPASE SERPL-CCNC: 303 U/L (ref 4–60)
LYMPHOCYTES # BLD AUTO: 2.76 K/UL (ref 1–4.8)
MAGNESIUM SERPL-MCNC: 1.9 MG/DL (ref 1.6–2.6)
MCH RBC QN AUTO: 30.9 PG (ref 27–50)
MCHC RBC AUTO-ENTMCNC: 33.9 G/DL (ref 32–36)
MCV RBC AUTO: 91 FL (ref 82–98)
MICROSCOPIC COMMENT: ABNORMAL
NITRITE UR QL STRIP: NEGATIVE
NUCLEATED RBC (/100WBC) (OHS): 0 /100 WBC
PH UR STRIP: 6 [PH]
PLATELET # BLD AUTO: 280 K/UL (ref 150–450)
PMV BLD AUTO: 10 FL (ref 9.2–12.9)
POTASSIUM SERPL-SCNC: 4 MMOL/L (ref 3.5–5.1)
PROT SERPL-MCNC: 7.9 GM/DL (ref 6–8.4)
PROT UR QL STRIP: ABNORMAL
RBC # BLD AUTO: 4.24 M/UL (ref 4–5.4)
RBC #/AREA URNS AUTO: 1 /HPF (ref 0–4)
RELATIVE EOSINOPHIL (OHS): 3.6 %
RELATIVE LYMPHOCYTE (OHS): 39.4 % (ref 18–48)
RELATIVE MONOCYTE (OHS): 7 % (ref 4–15)
RELATIVE NEUTROPHIL (OHS): 48.3 % (ref 38–73)
SODIUM SERPL-SCNC: 140 MMOL/L (ref 136–145)
SP GR UR STRIP: 1.02
SQUAMOUS #/AREA URNS AUTO: 3 /HPF
UROBILINOGEN UR STRIP-ACNC: ABNORMAL EU/DL
WBC # BLD AUTO: 7 K/UL (ref 3.9–12.7)
WBC #/AREA URNS AUTO: 7 /HPF (ref 0–5)

## 2025-03-24 PROCEDURE — 81001 URINALYSIS AUTO W/SCOPE: CPT | Performed by: PHYSICIAN ASSISTANT

## 2025-03-24 PROCEDURE — 63600175 PHARM REV CODE 636 W HCPCS: Performed by: PHYSICIAN ASSISTANT

## 2025-03-24 PROCEDURE — 93010 ELECTROCARDIOGRAM REPORT: CPT | Mod: ,,, | Performed by: INTERNAL MEDICINE

## 2025-03-24 PROCEDURE — 93005 ELECTROCARDIOGRAM TRACING: CPT

## 2025-03-24 PROCEDURE — 96361 HYDRATE IV INFUSION ADD-ON: CPT

## 2025-03-24 PROCEDURE — 96375 TX/PRO/DX INJ NEW DRUG ADDON: CPT

## 2025-03-24 PROCEDURE — 85025 COMPLETE CBC W/AUTO DIFF WBC: CPT | Performed by: PHYSICIAN ASSISTANT

## 2025-03-24 PROCEDURE — 96376 TX/PRO/DX INJ SAME DRUG ADON: CPT

## 2025-03-24 PROCEDURE — 82962 GLUCOSE BLOOD TEST: CPT

## 2025-03-24 PROCEDURE — G0378 HOSPITAL OBSERVATION PER HR: HCPCS

## 2025-03-24 PROCEDURE — 11000001 HC ACUTE MED/SURG PRIVATE ROOM

## 2025-03-24 PROCEDURE — 36415 COLL VENOUS BLD VENIPUNCTURE: CPT | Performed by: PHYSICIAN ASSISTANT

## 2025-03-24 PROCEDURE — 25500020 PHARM REV CODE 255: Performed by: PHYSICIAN ASSISTANT

## 2025-03-24 PROCEDURE — 96365 THER/PROPH/DIAG IV INF INIT: CPT

## 2025-03-24 PROCEDURE — 80053 COMPREHEN METABOLIC PANEL: CPT | Performed by: PHYSICIAN ASSISTANT

## 2025-03-24 PROCEDURE — 99285 EMERGENCY DEPT VISIT HI MDM: CPT | Mod: 25

## 2025-03-24 PROCEDURE — 83735 ASSAY OF MAGNESIUM: CPT | Performed by: PHYSICIAN ASSISTANT

## 2025-03-24 PROCEDURE — 82977 ASSAY OF GGT: CPT

## 2025-03-24 PROCEDURE — 25000003 PHARM REV CODE 250: Performed by: PHYSICIAN ASSISTANT

## 2025-03-24 PROCEDURE — 83690 ASSAY OF LIPASE: CPT | Performed by: PHYSICIAN ASSISTANT

## 2025-03-24 RX ORDER — LOPERAMIDE HYDROCHLORIDE 2 MG/1
4 CAPSULE ORAL ONCE AS NEEDED
Status: DISCONTINUED | OUTPATIENT
Start: 2025-03-25 | End: 2025-03-28 | Stop reason: HOSPADM

## 2025-03-24 RX ORDER — BENZONATATE 100 MG/1
100 CAPSULE ORAL 3 TIMES DAILY PRN
Status: DISCONTINUED | OUTPATIENT
Start: 2025-03-25 | End: 2025-03-28 | Stop reason: HOSPADM

## 2025-03-24 RX ORDER — ALUMINUM HYDROXIDE, MAGNESIUM HYDROXIDE, AND SIMETHICONE 1200; 120; 1200 MG/30ML; MG/30ML; MG/30ML
30 SUSPENSION ORAL 4 TIMES DAILY PRN
Status: DISCONTINUED | OUTPATIENT
Start: 2025-03-25 | End: 2025-03-28 | Stop reason: HOSPADM

## 2025-03-24 RX ORDER — TALC
6 POWDER (GRAM) TOPICAL NIGHTLY PRN
Status: DISCONTINUED | OUTPATIENT
Start: 2025-03-25 | End: 2025-03-28 | Stop reason: HOSPADM

## 2025-03-24 RX ORDER — IBUPROFEN 200 MG
16 TABLET ORAL
Status: DISCONTINUED | OUTPATIENT
Start: 2025-03-25 | End: 2025-03-28 | Stop reason: HOSPADM

## 2025-03-24 RX ORDER — ONDANSETRON HYDROCHLORIDE 2 MG/ML
4 INJECTION, SOLUTION INTRAVENOUS EVERY 6 HOURS PRN
Status: DISCONTINUED | OUTPATIENT
Start: 2025-03-25 | End: 2025-03-28 | Stop reason: HOSPADM

## 2025-03-24 RX ORDER — GLUCAGON 1 MG
1 KIT INJECTION
Status: DISCONTINUED | OUTPATIENT
Start: 2025-03-25 | End: 2025-03-28 | Stop reason: HOSPADM

## 2025-03-24 RX ORDER — MORPHINE SULFATE 4 MG/ML
2 INJECTION, SOLUTION INTRAMUSCULAR; INTRAVENOUS
Refills: 0 | Status: COMPLETED | OUTPATIENT
Start: 2025-03-24 | End: 2025-03-24

## 2025-03-24 RX ORDER — NALOXONE HCL 0.4 MG/ML
0.02 VIAL (ML) INJECTION
Status: DISCONTINUED | OUTPATIENT
Start: 2025-03-25 | End: 2025-03-28 | Stop reason: HOSPADM

## 2025-03-24 RX ORDER — LEVOTHYROXINE SODIUM 100 UG/1
100 TABLET ORAL DAILY
Status: DISCONTINUED | OUTPATIENT
Start: 2025-03-25 | End: 2025-03-28 | Stop reason: HOSPADM

## 2025-03-24 RX ORDER — FLUTICASONE PROPIONATE 50 MCG
1 SPRAY, SUSPENSION (ML) NASAL 2 TIMES DAILY PRN
Status: DISCONTINUED | OUTPATIENT
Start: 2025-03-25 | End: 2025-03-28 | Stop reason: HOSPADM

## 2025-03-24 RX ORDER — CEFTRIAXONE 1 G/1
1 INJECTION, POWDER, FOR SOLUTION INTRAMUSCULAR; INTRAVENOUS
Status: COMPLETED | OUTPATIENT
Start: 2025-03-24 | End: 2025-03-24

## 2025-03-24 RX ORDER — GUAIFENESIN 100 MG/5ML
200 LIQUID ORAL EVERY 4 HOURS PRN
Status: DISCONTINUED | OUTPATIENT
Start: 2025-03-25 | End: 2025-03-28 | Stop reason: HOSPADM

## 2025-03-24 RX ORDER — MORPHINE SULFATE 4 MG/ML
4 INJECTION, SOLUTION INTRAMUSCULAR; INTRAVENOUS
Refills: 0 | Status: DISCONTINUED | OUTPATIENT
Start: 2025-03-25 | End: 2025-03-27

## 2025-03-24 RX ORDER — POLYETHYLENE GLYCOL 3350 17 G/17G
17 POWDER, FOR SOLUTION ORAL DAILY
Status: DISCONTINUED | OUTPATIENT
Start: 2025-03-25 | End: 2025-03-28 | Stop reason: HOSPADM

## 2025-03-24 RX ORDER — FAMOTIDINE 10 MG/ML
20 INJECTION, SOLUTION INTRAVENOUS
Status: COMPLETED | OUTPATIENT
Start: 2025-03-24 | End: 2025-03-24

## 2025-03-24 RX ORDER — SODIUM CHLORIDE 0.9 % (FLUSH) 0.9 %
10 SYRINGE (ML) INJECTION EVERY 12 HOURS PRN
Status: DISCONTINUED | OUTPATIENT
Start: 2025-03-25 | End: 2025-03-28 | Stop reason: HOSPADM

## 2025-03-24 RX ORDER — ALBUTEROL SULFATE 2.5 MG/.5ML
2.5 SOLUTION RESPIRATORY (INHALATION) EVERY 6 HOURS PRN
Status: DISCONTINUED | OUTPATIENT
Start: 2025-03-25 | End: 2025-03-28 | Stop reason: HOSPADM

## 2025-03-24 RX ORDER — CITALOPRAM 20 MG/1
20 TABLET, FILM COATED ORAL DAILY
Status: DISCONTINUED | OUTPATIENT
Start: 2025-03-25 | End: 2025-03-28 | Stop reason: HOSPADM

## 2025-03-24 RX ORDER — MORPHINE SULFATE 4 MG/ML
4 INJECTION, SOLUTION INTRAMUSCULAR; INTRAVENOUS
Refills: 0 | Status: COMPLETED | OUTPATIENT
Start: 2025-03-24 | End: 2025-03-24

## 2025-03-24 RX ORDER — IBUPROFEN 200 MG
24 TABLET ORAL
Status: DISCONTINUED | OUTPATIENT
Start: 2025-03-25 | End: 2025-03-28 | Stop reason: HOSPADM

## 2025-03-24 RX ORDER — HYDRALAZINE HYDROCHLORIDE 20 MG/ML
10 INJECTION INTRAMUSCULAR; INTRAVENOUS EVERY 6 HOURS PRN
Status: DISCONTINUED | OUTPATIENT
Start: 2025-03-25 | End: 2025-03-28 | Stop reason: HOSPADM

## 2025-03-24 RX ORDER — MORPHINE SULFATE 4 MG/ML
2 INJECTION, SOLUTION INTRAMUSCULAR; INTRAVENOUS
Status: DISCONTINUED | OUTPATIENT
Start: 2025-03-25 | End: 2025-03-27

## 2025-03-24 RX ORDER — DEXTROMETHORPHAN POLISTIREX 30 MG/5 ML
1 SUSPENSION, EXTENDED RELEASE 12 HR ORAL DAILY PRN
Status: DISCONTINUED | OUTPATIENT
Start: 2025-03-25 | End: 2025-03-28 | Stop reason: HOSPADM

## 2025-03-24 RX ORDER — PROCHLORPERAZINE EDISYLATE 5 MG/ML
5 INJECTION INTRAMUSCULAR; INTRAVENOUS EVERY 6 HOURS PRN
Status: DISCONTINUED | OUTPATIENT
Start: 2025-03-25 | End: 2025-03-28 | Stop reason: HOSPADM

## 2025-03-24 RX ORDER — BUTALBITAL, ACETAMINOPHEN AND CAFFEINE 50; 325; 40 MG/1; MG/1; MG/1
2 TABLET ORAL EVERY 8 HOURS PRN
Status: DISCONTINUED | OUTPATIENT
Start: 2025-03-25 | End: 2025-03-27

## 2025-03-24 RX ORDER — ACETAMINOPHEN 325 MG/1
650 TABLET ORAL EVERY 4 HOURS PRN
Status: DISCONTINUED | OUTPATIENT
Start: 2025-03-25 | End: 2025-03-27

## 2025-03-24 RX ORDER — ONDANSETRON HYDROCHLORIDE 2 MG/ML
8 INJECTION, SOLUTION INTRAVENOUS
Status: COMPLETED | OUTPATIENT
Start: 2025-03-24 | End: 2025-03-24

## 2025-03-24 RX ADMIN — IOHEXOL 100 ML: 350 INJECTION, SOLUTION INTRAVENOUS at 07:03

## 2025-03-24 RX ADMIN — PROMETHAZINE HYDROCHLORIDE 25 MG: 25 INJECTION INTRAMUSCULAR; INTRAVENOUS at 08:03

## 2025-03-24 RX ADMIN — ONDANSETRON 8 MG: 2 INJECTION INTRAMUSCULAR; INTRAVENOUS at 06:03

## 2025-03-24 RX ADMIN — MORPHINE SULFATE 2 MG: 4 INJECTION INTRAVENOUS at 08:03

## 2025-03-24 RX ADMIN — CEFTRIAXONE 1 G: 1 INJECTION, POWDER, FOR SOLUTION INTRAMUSCULAR; INTRAVENOUS at 09:03

## 2025-03-24 RX ADMIN — MORPHINE SULFATE 4 MG: 4 INJECTION INTRAVENOUS at 06:03

## 2025-03-24 RX ADMIN — SODIUM CHLORIDE, POTASSIUM CHLORIDE, SODIUM LACTATE AND CALCIUM CHLORIDE 1000 ML: 600; 310; 30; 20 INJECTION, SOLUTION INTRAVENOUS at 09:03

## 2025-03-24 RX ADMIN — SODIUM CHLORIDE, POTASSIUM CHLORIDE, SODIUM LACTATE AND CALCIUM CHLORIDE 1000 ML: 600; 310; 30; 20 INJECTION, SOLUTION INTRAVENOUS at 06:03

## 2025-03-24 RX ADMIN — FAMOTIDINE 20 MG: 10 INJECTION, SOLUTION INTRAVENOUS at 06:03

## 2025-03-25 PROBLEM — K80.20 CALCULUS OF GALLBLADDER: Status: ACTIVE | Noted: 2025-03-25

## 2025-03-25 PROBLEM — R74.8 ELEVATED LIPASE: Status: ACTIVE | Noted: 2025-03-25

## 2025-03-25 PROBLEM — R10.13 EPIGASTRIC PAIN: Status: ACTIVE | Noted: 2025-03-25

## 2025-03-25 PROBLEM — K80.00 CHOLELITHIASIS WITH ACUTE CHOLECYSTITIS: Status: ACTIVE | Noted: 2025-03-25

## 2025-03-25 LAB
ABSOLUTE EOSINOPHIL (OHS): 0.26 K/UL
ABSOLUTE MONOCYTE (OHS): 0.49 K/UL (ref 0.3–1)
ABSOLUTE NEUTROPHIL COUNT (OHS): 2.25 K/UL (ref 1.8–7.7)
ALBUMIN SERPL BCP-MCNC: 3.3 G/DL (ref 3.5–5.2)
ALP SERPL-CCNC: 91 UNIT/L (ref 40–150)
ALT SERPL W/O P-5'-P-CCNC: 129 UNIT/L (ref 10–44)
ANION GAP (OHS): 6 MMOL/L (ref 8–16)
AST SERPL-CCNC: 132 UNIT/L (ref 11–45)
BASOPHILS # BLD AUTO: 0.06 K/UL
BASOPHILS NFR BLD AUTO: 1 %
BILIRUB SERPL-MCNC: 0.2 MG/DL (ref 0.1–1)
BUN SERPL-MCNC: 12 MG/DL (ref 6–20)
CALCIUM SERPL-MCNC: 9.4 MG/DL (ref 8.7–10.5)
CHLORIDE SERPL-SCNC: 111 MMOL/L (ref 95–110)
CO2 SERPL-SCNC: 26 MMOL/L (ref 23–29)
CREAT SERPL-MCNC: 0.8 MG/DL (ref 0.5–1.4)
ERYTHROCYTE [DISTWIDTH] IN BLOOD BY AUTOMATED COUNT: 13.2 % (ref 11.5–14.5)
GFR SERPLBLD CREATININE-BSD FMLA CKD-EPI: >60 ML/MIN/1.73/M2
GGT SERPL-CCNC: 133 U/L (ref 8–55)
GLUCOSE SERPL-MCNC: 133 MG/DL (ref 70–110)
HAV IGM SERPL QL IA: NORMAL
HBV CORE IGM SERPL QL IA: NORMAL
HBV SURFACE AG SERPL QL IA: NORMAL
HCT VFR BLD AUTO: 35.9 % (ref 37–48.5)
HCV AB SERPL QL IA: NORMAL
HGB BLD-MCNC: 11.5 GM/DL (ref 12–16)
IMM GRANULOCYTES # BLD AUTO: 0.03 K/UL (ref 0–0.04)
IMM GRANULOCYTES NFR BLD AUTO: 0.5 % (ref 0–0.5)
LYMPHOCYTES # BLD AUTO: 2.67 K/UL (ref 1–4.8)
MAGNESIUM SERPL-MCNC: 1.7 MG/DL (ref 1.6–2.6)
MCH RBC QN AUTO: 30.2 PG (ref 27–50)
MCHC RBC AUTO-ENTMCNC: 32 G/DL (ref 32–36)
MCV RBC AUTO: 94 FL (ref 82–98)
NUCLEATED RBC (/100WBC) (OHS): 0 /100 WBC
OHS QRS DURATION: 88 MS
OHS QTC CALCULATION: 452 MS
PHOSPHATE SERPL-MCNC: 2.9 MG/DL (ref 2.7–4.5)
PLATELET # BLD AUTO: 234 K/UL (ref 150–450)
PMV BLD AUTO: 10.3 FL (ref 9.2–12.9)
POCT GLUCOSE: 139 MG/DL (ref 70–110)
POTASSIUM SERPL-SCNC: 4.7 MMOL/L (ref 3.5–5.1)
PROT SERPL-MCNC: 6.4 GM/DL (ref 6–8.4)
RBC # BLD AUTO: 3.81 M/UL (ref 4–5.4)
RELATIVE EOSINOPHIL (OHS): 4.5 %
RELATIVE LYMPHOCYTE (OHS): 46.4 % (ref 18–48)
RELATIVE MONOCYTE (OHS): 8.5 % (ref 4–15)
RELATIVE NEUTROPHIL (OHS): 39.1 % (ref 38–73)
SODIUM SERPL-SCNC: 143 MMOL/L (ref 136–145)
WBC # BLD AUTO: 5.76 K/UL (ref 3.9–12.7)

## 2025-03-25 PROCEDURE — 11000001 HC ACUTE MED/SURG PRIVATE ROOM

## 2025-03-25 PROCEDURE — G0378 HOSPITAL OBSERVATION PER HR: HCPCS

## 2025-03-25 PROCEDURE — 63600175 PHARM REV CODE 636 W HCPCS

## 2025-03-25 PROCEDURE — 36415 COLL VENOUS BLD VENIPUNCTURE: CPT | Mod: 59 | Performed by: NURSE PRACTITIONER

## 2025-03-25 PROCEDURE — 94761 N-INVAS EAR/PLS OXIMETRY MLT: CPT

## 2025-03-25 PROCEDURE — 80053 COMPREHEN METABOLIC PANEL: CPT

## 2025-03-25 PROCEDURE — 25000003 PHARM REV CODE 250

## 2025-03-25 PROCEDURE — 84100 ASSAY OF PHOSPHORUS: CPT

## 2025-03-25 PROCEDURE — 25000003 PHARM REV CODE 250: Performed by: SURGERY

## 2025-03-25 PROCEDURE — 83735 ASSAY OF MAGNESIUM: CPT

## 2025-03-25 PROCEDURE — 96376 TX/PRO/DX INJ SAME DRUG ADON: CPT

## 2025-03-25 PROCEDURE — 80074 ACUTE HEPATITIS PANEL: CPT | Performed by: NURSE PRACTITIONER

## 2025-03-25 PROCEDURE — 36415 COLL VENOUS BLD VENIPUNCTURE: CPT

## 2025-03-25 PROCEDURE — 99223 1ST HOSP IP/OBS HIGH 75: CPT | Mod: ,,, | Performed by: NURSE PRACTITIONER

## 2025-03-25 PROCEDURE — 85025 COMPLETE CBC W/AUTO DIFF WBC: CPT

## 2025-03-25 PROCEDURE — 96361 HYDRATE IV INFUSION ADD-ON: CPT

## 2025-03-25 PROCEDURE — 63600175 PHARM REV CODE 636 W HCPCS: Performed by: SURGERY

## 2025-03-25 PROCEDURE — 99900035 HC TECH TIME PER 15 MIN (STAT)

## 2025-03-25 RX ORDER — PANTOPRAZOLE SODIUM 40 MG/1
40 TABLET, DELAYED RELEASE ORAL DAILY
Status: DISCONTINUED | OUTPATIENT
Start: 2025-03-25 | End: 2025-03-28 | Stop reason: HOSPADM

## 2025-03-25 RX ORDER — LOSARTAN POTASSIUM 25 MG/1
25 TABLET ORAL DAILY
Status: DISCONTINUED | OUTPATIENT
Start: 2025-03-26 | End: 2025-03-28 | Stop reason: HOSPADM

## 2025-03-25 RX ORDER — SODIUM CHLORIDE, SODIUM LACTATE, POTASSIUM CHLORIDE, CALCIUM CHLORIDE 600; 310; 30; 20 MG/100ML; MG/100ML; MG/100ML; MG/100ML
INJECTION, SOLUTION INTRAVENOUS CONTINUOUS
Status: DISCONTINUED | OUTPATIENT
Start: 2025-03-25 | End: 2025-03-27

## 2025-03-25 RX ORDER — FUROSEMIDE 20 MG/1
20 TABLET ORAL 2 TIMES DAILY
Status: DISCONTINUED | OUTPATIENT
Start: 2025-03-25 | End: 2025-03-28 | Stop reason: HOSPADM

## 2025-03-25 RX ADMIN — BUTALBITAL, ACETAMINOPHEN, AND CAFFEINE 2 TABLET: 325; 50; 40 TABLET ORAL at 02:03

## 2025-03-25 RX ADMIN — CITALOPRAM HYDROBROMIDE 20 MG: 20 TABLET ORAL at 07:03

## 2025-03-25 RX ADMIN — MORPHINE SULFATE 2 MG: 4 INJECTION, SOLUTION INTRAMUSCULAR; INTRAVENOUS at 06:03

## 2025-03-25 RX ADMIN — MORPHINE SULFATE 2 MG: 4 INJECTION, SOLUTION INTRAMUSCULAR; INTRAVENOUS at 02:03

## 2025-03-25 RX ADMIN — MORPHINE SULFATE 2 MG: 4 INJECTION, SOLUTION INTRAMUSCULAR; INTRAVENOUS at 12:03

## 2025-03-25 RX ADMIN — FUROSEMIDE 20 MG: 20 TABLET ORAL at 04:03

## 2025-03-25 RX ADMIN — MORPHINE SULFATE 2 MG: 4 INJECTION, SOLUTION INTRAMUSCULAR; INTRAVENOUS at 11:03

## 2025-03-25 RX ADMIN — MORPHINE SULFATE 2 MG: 4 INJECTION, SOLUTION INTRAMUSCULAR; INTRAVENOUS at 07:03

## 2025-03-25 RX ADMIN — PANTOPRAZOLE SODIUM 40 MG: 40 TABLET, DELAYED RELEASE ORAL at 04:03

## 2025-03-25 RX ADMIN — SODIUM CHLORIDE, POTASSIUM CHLORIDE, SODIUM LACTATE AND CALCIUM CHLORIDE: 600; 310; 30; 20 INJECTION, SOLUTION INTRAVENOUS at 02:03

## 2025-03-25 RX ADMIN — LEVOTHYROXINE SODIUM 100 MCG: 0.1 TABLET ORAL at 07:03

## 2025-03-25 NOTE — PLAN OF CARE
Pt alert able to make needs known, tolerates medications well by mouth/ crushed, IV fluids remain in progress. Repositioned self q 2hrs, pain controlled by PRN pain medication, plan of care explained, diet tolerated, pt denies n,v,d, Remains free from falls and hospital acquired pressure injuries, safety maintained. Will continue following plan of care.      Problem: Adult Inpatient Plan of Care  Goal: Plan of Care Review  3/25/2025 1801 by Vasile Ochoa RN  Outcome: Progressing  3/25/2025 1726 by Vasile Ochoa RN  Outcome: Progressing     Problem: Adult Inpatient Plan of Care  Goal: Patient-Specific Goal (Individualized)  Outcome: Progressing     Problem: Adult Inpatient Plan of Care  Goal: Absence of Hospital-Acquired Illness or Injury  Outcome: Progressing     Problem: Adult Inpatient Plan of Care  Goal: Optimal Comfort and Wellbeing  Outcome: Progressing     Problem: Adult Inpatient Plan of Care  Goal: Readiness for Transition of Care  Outcome: Progressing     Problem: Infection  Goal: Absence of Infection Signs and Symptoms  Outcome: Progressing

## 2025-03-25 NOTE — H&P
[facility] Medicine  History & Physical    Patient Name: Kenna Robert  MRN: [id number]  Patient Class: OP- Observation  Admission Date: 3/24/2025  Attending Physician: Yanet Santillan MD   Primary Care Provider: Gio Elizabeth MD         Patient information was obtained from patient, spouse/SO, past medical records, and ER records.     Subjective:     Principal Problem:Calculus of gallbladder    Chief Complaint:   Chief Complaint   Patient presents with    Abdominal Pain     Pt arrived in ED, c/o abd pain, nausea and diarrhea x 2 weeks. Pt reports recently stopping Ozempic. Pt denies any CP, SOB, or vomiting.     Nausea        HPI: Kenna Robert is a 59 y.o. female with  BMI 35, MASH, Non-Insulin-dependent DM2, HTN, and HLD who presented to [facility] ED on 03/24/2025 for eval and treatment of intractable abdo pain.  They describe their pain as cramping, 10/10, located in the epigastric area without radiation.  It started 2 weeks ago and has been worsening since, with symptom frequency now constant.  She's also concerned that she is having constant diarrhea, as she has previously been constipated while on Ozempic.  There is associated N/V.  They deny associated fever, chills, chest pain, dyspnea, urinary sxs.  Symptoms are alleviated by none of the OTC meds she's tried.  Symptoms are worsened by movement and eating.  She's been on Ozempic for the past 8 months (reports minimal benefits noticed), and was told by her PCP to pause it given her sxs and outpatient labs showing pancreatitis last week.    ED course: lipase 303. LFTs also elevated 200s AST/177 ALT.  Hypertensive on arrival but VS otherwise unremarkable.  Exam with general abdo tenderness worse in upper abdomen.  US: Cholelithiasis with small amount of gallbladder sludge.  No ultrasonographic evidence to suggest acute cholecystitis.  ED therapy measures: morphine, zofran and phenergan.  They were placed in observation under the care  of [facility] Medicine for further evaluation and treatment.      Past Medical History:   Diagnosis Date    Hypercholesteremia     Hypertension     Thyroid disease        Past Surgical History:   Procedure Laterality Date    TUBAL LIGATION         Review of patient's allergies indicates:   Allergen Reactions    Lisinopril Other (See Comments)     Bad cough  Unspecified    Statins-hmg-coa reductase inhibitors Other (See Comments)     Muscle aches  unspecified       No current facility-administered medications on file prior to encounter.     Current Outpatient Medications on File Prior to Encounter   Medication Sig    albuterol (PROVENTIL/VENTOLIN HFA) 90 mcg/actuation inhaler Inhale 1-2 puffs into the lungs every 6 (six) hours as needed for Wheezing or Shortness of Breath. Rescue    butalbital-acetaminophen-caffeine -40 mg (FIORICET, ESGIC) -40 mg per tablet Take 2 tablets by mouth every 8 (eight) hours as needed for Headaches.    citalopram (CELEXA) 10 MG tablet Take 20 mg by mouth once daily.     famotidine (PEPCID) 20 MG tablet Take 2 tablets (40 mg total) by mouth 2 (two) times daily.    fluticasone (FLONASE) 50 mcg/actuation nasal spray 1 spray (50 mcg total) by Each Nare route 2 (two) times daily as needed for Rhinitis.    furosemide (LASIX) 20 MG tablet Take 20 mg by mouth 2 (two) times daily.    gemfibrozil (LOPID) 600 MG tablet Take 600 mg by mouth 2 (two) times daily before meals.    ibuprofen (ADVIL,MOTRIN) 600 MG tablet Take 1 tablet (600 mg total) by mouth every 6 (six) hours as needed for Pain (Take with food as needed for mild-to-moderate pain).    levothyroxine (SYNTHROID) 100 MCG tablet Take 100 mcg by mouth once daily.      metformin (GLUCOPHAGE) 1000 MG tablet Take 1,000 mg by mouth 2 (two) times daily with meals.    naproxen (NAPROSYN) 500 MG tablet Take 1 tablet (500 mg total) by mouth 2 (two) times daily.    omeprazole (PRILOSEC) 20 MG capsule Take 20 mg by mouth once  daily.    ondansetron (ZOFRAN-ODT) 4 MG TbDL Take 1 tablet (4 mg total) by mouth every 6 (six) hours as needed (nausea).     Family History    None       Tobacco Use    Smoking status: Some Days     Current packs/day: 0.00     Types: Cigarettes     Last attempt to quit: 2012     Years since quittin.4    Smokeless tobacco: Not on file   Substance and Sexual Activity    Alcohol use: No    Drug use: No    Sexual activity: Not on file     Review of Systems   Reason unable to perform ROS: ROS was performed and pertinent +s and -s are listed in HPI.     Objective:     Vital Signs (Most Recent):  Temp: 98 °F (36.7 °C) (25)  Pulse: 75 (25)  Resp: 18 (25)  BP: (!) 171/81 (25)  SpO2: 95 % (25) Vital Signs (24h Range):  Temp:  [98 °F (36.7 °C)-98.4 °F (36.9 °C)] 98 °F (36.7 °C)  Pulse:  [73-87] 75  Resp:  [16-18] 18  SpO2:  [95 %-96 %] 95 %  BP: (141-171)/(67-85) 171/81     Weight: 93 kg (205 lb)  Body mass index is 35.19 kg/m².     Physical Exam  Constitutional:       General: She is not in acute distress.     Appearance: Normal appearance. She is obese. She is not ill-appearing.   HENT:      Head: Normocephalic.   Neck:      Comments: JVP not elevated  Cardiovascular:      Rate and Rhythm: Normal rate and regular rhythm.      Pulses: Normal pulses.      Heart sounds: Normal heart sounds.   Pulmonary:      Effort: Pulmonary effort is normal.      Breath sounds: Normal breath sounds.   Abdominal:      General: Abdomen is flat. Bowel sounds are normal.      Tenderness: There is abdominal tenderness. There is guarding. There is no right CVA tenderness, left CVA tenderness or rebound.   Musculoskeletal:      Right lower leg: No edema.      Left lower leg: No edema.   Skin:     General: Skin is warm and dry.      Capillary Refill: Capillary refill takes less than 2 seconds.      Coloration: Skin is not jaundiced.   Neurological:      General: No focal deficit  present.      Mental Status: She is alert and oriented to person, place, and time.   Psychiatric:         Mood and Affect: Mood normal.         Behavior: Behavior normal.                Significant Labs: All pertinent labs within the past 24 hours have been reviewed.  Bilirubin:   Recent Labs   Lab 03/09/25  2201 03/24/25  1815   BILITOT 0.3 0.4     CBC:   Recent Labs   Lab 03/24/25  1815   WBC 7.00   HGB 13.1   HCT 38.6        CMP:   Recent Labs   Lab 03/24/25  1815      K 4.0      CO2 24   BUN 15   CREATININE 0.9   CALCIUM 10.0   ALBUMIN 4.1   BILITOT 0.4   ALKPHOS 111   *   *   ANIONGAP 9     Urine Studies:   Recent Labs   Lab 03/24/25 1810   COLORU Yellow   APPEARANCEUA Clear   PHUR 6.0   SPECGRAV 1.025   PROTEINUA 1+*   GLUCUA Trace*   BILIRUBINUA Negative   OCCULTUA Negative   NITRITE Negative   UROBILINOGEN 2.0-3.0*   LEUKOCYTESUR 2+*   RBCUA 1   WBCUA 7*   BACTERIA Rare   HYALINECASTS 0       Significant Imaging: I have reviewed all pertinent imaging results/findings within the past 24 hours.    Results for orders placed or performed during the hospital encounter of 03/24/25 (from the past 2160 hours)   CT Abdomen Pelvis With IV Contrast NO Oral Contrast    Narrative    EXAMINATION:  CT ABDOMEN PELVIS WITH IV CONTRAST    CLINICAL HISTORY:  Epigastric pain;    TECHNIQUE:  Low dose axial images, sagittal and coronal reformations were obtained from the lung bases to the pubic symphysis following the IV administration of 100 mL of Omnipaque 350 .  Oral contrast was not given.    COMPARISON:  None.    FINDINGS:  The visualized portion of the heart is unremarkable.  The lung bases are clear.    Liver is enlarged measuring 21 cm and diffusely hypoattenuating in appearance which is most suggestive for diffuse fatty infiltration.  Spleen is enlarged measuring 13.5 cm.  There is no intra-or extrahepatic biliary ductal dilatation.  Large calcified stone is seen in the gallbladder.  The  stomach, pancreas, and adrenal glands are unremarkable.    Kidneys enhance normally with no evidence of hydronephrosis.  There is diffuse bilateral lobular contour of the kidneys which could reflect possible mild cortical scarring.  Two small right renal hypodensities, probable small cysts are seen.  No abnormalities are seen along the ureteral courses.  Urinary bladder is nondistended.  Uterus is unremarkable.    Appendix is visualized and is unremarkable.  The visualized loops of small and large bowel show no evidence of obstruction or inflammation.  No free air or free fluid.    Aorta tapers normally with moderate calcified and noncalcified plaque.  Abdominal aortic branch vessels and iliacs are patent.    No acute osseous abnormality identified. Subcutaneous soft tissues show no significant abnormalities.      Impression    1. No acute intra-abdominal abnormalities identified.  2. Cholelithiasis.  3. Hepatosplenomegaly with possible hepatic steatosis.  4. Additional findings as detailed above.      Electronically signed by: Crystal Elder MD  Date:    03/24/2025  Time:    19:56                             Assessment/Plan:     * Calculus of gallbladder  Elevated lipase  Epigastric pain    59F w BMI 35 p/w several weeks of upper abdo pain.  Labs w elevated lipase and liver enzymes.  Gallstones on US.  Pancrease appears normal on CT.  Favor gallstone-driven pathology over primary pancreatitis.    Place in Obs  GI consult  NPO at [facility] in anticipation of MRCP  Next steps ([facility] at [facility] [place] vs [facility]) per MRCP result  Pain control  IV fluids          VTE Risk Mitigation (From admission, onward)           Ordered     IP VTE HIGH RISK PATIENT  Once         03/24/25 2330     Place sequential compression device  Until discontinued         03/24/25 2330     Reason for No Pharmacological VTE Prophylaxis  Once        Question:  Reasons:  Answer:  Patient is Ambulatory    03/24/25 2330                       On 03/25/2025, patient  should be placed in hospital observation services under my care.             Leoncio Strickland MD  Department of Hospital Medicine  [facility]

## 2025-03-25 NOTE — ASSESSMENT & PLAN NOTE
Elevated lipase  Epigastric pain    59F w BMI 35 p/w several weeks of upper abdo pain.  Labs w elevated lipase and liver enzymes.  Gallstones on US.  Pancrease appears normal on CT.  Favor gallstone-driven pathology over primary pancreatitis.    Place in Obs  GI consulted. Suspects gallstone pancreatitis vs. Cholelithiasis vs. Ozempic induced pancreatitis.  MRCP pending  Next steps ([facility] [place] [facility] [place] [facility]) per MRCP result  Pain control  IV fluids @ 125 mL/hr  General surgery consulted. Recommendations pending.

## 2025-03-25 NOTE — PROGRESS NOTES
[facility] Medicine  Progress Note    Patient Name: Kenna Robert  MRN: [id number]  Patient Class: OP- Observation   Admission Date: 3/24/2025  Length of Stay: 0 days  Attending Physician: Moe Clayton MD  Primary Care Provider: Gio Elizabeth MD        Subjective     Principal Problem:Calculus of gallbladder        HPI:  Kenna Robert is a 59 y.o. female with  BMI 35, MASH, Non-Insulin-dependent DM2, HTN, and HLD who presented to [facility] ED on 03/24/2025 for eval and treatment of intractable abdo pain.  They describe their pain as cramping, 10/10, located in the epigastric area without radiation.  It started 2 weeks ago and has been worsening since, with symptom frequency now constant.  She's also concerned that she is having constant diarrhea, as she has previously been constipated while on Ozempic.  There is associated N/V.  They deny associated fever, chills, chest pain, dyspnea, urinary sxs.  Symptoms are alleviated by none of the OTC meds she's tried.  Symptoms are worsened by movement and eating.  She's been on Ozempic for the past 8 months (reports minimal benefits noticed), and was told by her PCP to pause it given her sxs and outpatient labs showing pancreatitis last week.    ED course: lipase 303. LFTs also elevated 200s AST/177 ALT.  Hypertensive on arrival but VS otherwise unremarkable.  Exam with general abdo tenderness worse in upper abdomen.  US: Cholelithiasis with small amount of gallbladder sludge.  No ultrasonographic evidence to suggest acute cholecystitis.  ED therapy measures: morphine, zofran and phenergan.  They were placed in observation under the care of [facility] for further evaluation and treatment.      Overview/Hospital Course:  No notes on file    Interval History: VSS, AF. Reports pain is somewhat improved today. Labwork concerning for gallstone pancreatitis. Gen Surg consulted.    Review of Systems   Constitutional:  Negative for  activity change, appetite change, chills, fatigue and fever.   Respiratory:  Negative for cough and shortness of breath.    Cardiovascular:  Negative for chest pain, palpitations and leg swelling.   Gastrointestinal:  Positive for abdominal pain and nausea. Negative for constipation and vomiting.   Neurological:  Negative for dizziness, syncope, weakness and light-headedness.     Objective:     Vital Signs (Most Recent):  Temp: 98 °F (36.7 °C) (03/25/25 1202)  Pulse: 78 (03/25/25 1202)  Resp: 18 (03/25/25 1202)  BP: (!) 165/67 (03/25/25 1202)  SpO2: 99 % (03/25/25 1202) Vital Signs (24h Range):  Temp:  [97.9 °F (36.6 °C)-98.6 °F (37 °C)] 98 °F (36.7 °C)  Pulse:  [73-87] 78  Resp:  [16-18] 18  SpO2:  [93 %-99 %] 99 %  BP: (131-171)/(64-85) 165/67     Weight: 93 kg (205 lb 0.4 oz)  Body mass index is 35.19 kg/m².    Intake/Output Summary (Last 24 hours) at 3/25/2025 1222  Last data filed at 3/25/2025 0900  Gross per 24 hour   Intake 1290 ml   Output --   Net 1290 ml         Physical Exam  Vitals and nursing note reviewed.   Constitutional:       Appearance: She is obese.   HENT:      Head: Normocephalic.      Mouth/Throat:      Pharynx: Oropharynx is clear.   Eyes:      Conjunctiva/sclera: Conjunctivae normal.   Cardiovascular:      Rate and Rhythm: Normal rate and regular rhythm.      Pulses: Normal pulses.   Pulmonary:      Effort: Pulmonary effort is normal.   Abdominal:      General: There is no distension.      Palpations: Abdomen is soft.      Tenderness: There is abdominal tenderness in the periumbilical area. There is no right CVA tenderness, left CVA tenderness or guarding.   Skin:     General: Skin is warm and dry.   Neurological:      General: No focal deficit present.      Mental Status: She is alert. Mental status is at baseline.   Psychiatric:         Mood and Affect: Mood normal.         Behavior: Behavior normal.               Significant Labs: All pertinent labs within the past 24 hours have been  reviewed.    Significant Imaging: I have reviewed all pertinent imaging results/findings within the past 24 hours.      Assessment & Plan  Calculus of gallbladder  Elevated lipase  Epigastric pain    59F w BMI 35 p/w several weeks of upper abdo pain.  Labs w elevated lipase and liver enzymes.  Gallstones on US.  Pancrease appears normal on CT.  Favor gallstone-driven pathology over primary pancreatitis.    Place in Obs  GI consulted. Suspects gallstone pancreatitis vs. Cholelithiasis vs. Ozempic induced pancreatitis.  MRCP pending  Next steps ([facility] at [facility]- vs [facility]) per MRCP result  Pain control  IV fluids @ 125 mL/hr  General surgery consulted. Recommendations pending.      Epigastric pain      Elevated lipase      VTE Risk Mitigation (From admission, onward)           Ordered     IP VTE HIGH RISK PATIENT  Once         03/24/25 2330     Place sequential compression device  Until discontinued         03/24/25 2330     Reason for No Pharmacological VTE Prophylaxis  Once        Question:  Reasons:  Answer:  Patient is Ambulatory    03/24/25 2330                    Discharge Planning   PEDRITO:      Code Status: Full Code   Medical Readiness for Discharge Date:                            Kathy Puri PA-C  Department of Hospital Medicine   [facility] Surg

## 2025-03-25 NOTE — CONSULTS
[facility] Gastroenterology Consultation Note    Patient Complaint: nausea, abdominal pain    PCP:   Gio Elizabeth       LOS: 0        Initial History of Present Illness (HPI):  This is a 59 y.o. female consulted to GI service for symptomatic cholecystitis. PMH BMI 35, MASH, Non-Insulin-dependent DM2, HTN, and HLD. Patient complaint of acute onset of epigastric abdominal pain with associated symptoms of nausea that began a week ago. Denies fever, sob, vomiting, hematemesis, diarrhea or constipation. Reports a history of GI problems, reports chronic nausea after food intake. Reports starting ozempic ~6 months ago and recently increasing dose to 2mg weekly prior to onset of symptoms. Reports she quit smoking 1 year ago. Denies any recent heavy drinking, stopped drinking in the 90s.     Lfts 177/133, 132/129        Medical History:  has a past medical history of Hypercholesteremia, Hypertension, and Thyroid disease.    Surgical History:  has a past surgical history that includes Tubal ligation.      Objective Findings:    Vital Signs:  Temp:  [97.9 °F (36.6 °C)-98.6 °F (37 °C)]   Pulse:  [73-87]   Resp:  [16-18]   BP: (131-171)/(64-85)   SpO2:  [93 %-97 %]   Body mass index is 35.19 kg/m².      Physical Exam  Vitals and nursing note reviewed.   Constitutional:       Appearance: Normal appearance.   HENT:      Head: Normocephalic.   Pulmonary:      Effort: Pulmonary effort is normal.   Abdominal:      General: Bowel sounds are normal.      Palpations: Abdomen is soft.   Skin:     General: Skin is warm and dry.   Neurological:      Mental Status: She is alert and oriented to person, place, and time.   Psychiatric:         Mood and Affect: Mood normal.         Behavior: Behavior normal.         Thought Content: Thought content normal.         Judgment: Judgment normal.               Labs:  Lab Results   Component Value Date    WBC 5.76 03/25/2025    HGB 11.5 (L) 03/25/2025    HCT 35.9 (L) 03/25/2025      03/25/2025     (H) 03/25/2025     (H) 03/25/2025     03/25/2025    K 4.7 03/25/2025     (H) 03/25/2025    CREATININE 0.8 03/25/2025    BUN 12 03/25/2025    CO2 26 03/25/2025           Imaging: 3/24/25 CT abdomen pelvis- No acute intra-abdominal abnormalities identified.  2. Cholelithiasis.  3. Hepatosplenomegaly with possible hepatic steatosis.        Gallstone Pancreatitis. Elevated lipase. Epigastric pain. Cholelithiasis. Elevated lfts.  Plan/ Recommendations:  1.  Patient reports intermitted abdominal pain and nausea prior to ozempic use, began ozempic ~6 months ago and recently increased the dose to 2mg ~1 week ago prior to symptom exacerbation. Patient visited pcp and diagnosed with pancreatitis, however pain began to increase. Today she reports pain as improved, denies vomiting. US unremarkable for choledocholithiasis with normal biliary ducts and bilirubin normal. CT and US notes gallstone. Lfts elevated, with suspicion of gallstone pancreatitis vs cholelithiasis and ozempic caused pancreatitis. Acute hep panel ordered. Consider gen surg consult to eval cholelithiasis. Rec abstain from ozempic and f/u with pcp to determine decreased dosing vs d/c. Rec supportive care with iv fluid @ 125, pain control, nausea control and ok for bland diet if pain controlled.      Thank you so much for allowing us to participate in the care of Kenna Robert . Please contact us if you have any additional questions.    Claire Sarabia NP  Gastroenterology  [facility] Surg

## 2025-03-25 NOTE — ED PROVIDER NOTES
"Encounter Date: 3/24/2025       History     Chief Complaint   Patient presents with    Abdominal Pain     Pt arrived in ED, c/o abd pain, nausea and diarrhea x 2 weeks. Pt reports recently stopping Ozempic. Pt denies any CP, SOB, or vomiting.     Nausea     Chief complaint: Abdominal pain    HPI:   59-year-old female history of HTN HLD DM  thyroid disease NAFLD presenting for evaluation of constant epigastric pain since yesterday evening.  Patient reports she was having intermittent symptoms over the past 3 weeks.  Was evaluated by her primary care doctor 6 days ago and had labs showing pancreatitis.  She is unsure of what her lab values were.  Of note, pt started Ozempic injections few months ago for DM and weight loss. She reports her doctor told her to discontinue Ozempic as this may be reason for her pancreatitis/abdominal pain and to come to ED for evaluation should pain worsen or become persistent. She has had associated nausea and diarrhea for the past couple of days.    Does have history of GERD. Takes omeprazole. Denies history of PUD or gastritis. Denies alcohol or drug use "since 1993."  Denies history of gallstones or pancreatitis.    Denies dysuria hematuria urinary urgency frequency chest pain shortness breath dizziness lightheadedness melena hematochezia or other associated symptoms.        The history is provided by the patient.     Review of patient's allergies indicates:   Allergen Reactions    Lisinopril Other (See Comments)     Bad cough  Unspecified    Statins-hmg-coa reductase inhibitors Other (See Comments)     Muscle aches  unspecified     Past Medical History:   Diagnosis Date    Hypercholesteremia     Hypertension     Thyroid disease      Past Surgical History:   Procedure Laterality Date    TUBAL LIGATION       No family history on file.  Social History[1]  Review of Systems   Constitutional:  Negative for chills and fever.   HENT:  Negative for congestion, ear pain, nosebleeds, " rhinorrhea, sore throat and trouble swallowing.    Eyes:  Negative for redness.   Respiratory:  Negative for cough, shortness of breath and stridor.    Cardiovascular:  Negative for chest pain and leg swelling.   Gastrointestinal:  Positive for abdominal pain, diarrhea and nausea. Negative for constipation and vomiting.   Genitourinary:  Negative for decreased urine volume, dysuria, frequency, hematuria and urgency.   Musculoskeletal:  Negative for back pain and neck pain.   Skin:  Negative for rash and wound.   Neurological:  Negative for dizziness, speech difficulty, weakness, light-headedness, numbness and headaches.   Psychiatric/Behavioral:  Negative for confusion.        Physical Exam     Initial Vitals [03/24/25 1633]   BP Pulse Resp Temp SpO2   (!) 141/67 87 16 98.3 °F (36.8 °C) 96 %      MAP       --         Physical Exam    Nursing note and vitals reviewed.  Constitutional: She appears well-developed and well-nourished.   HENT:   Head: Normocephalic.   Right Ear: External ear normal.   Left Ear: External ear normal.   Nose: Nose normal. Mouth/Throat: Oropharynx is clear and moist.   Eyes: Conjunctivae are normal.   Cardiovascular:  Normal rate and regular rhythm.     Exam reveals no gallop and no friction rub.       No murmur heard.  Pulmonary/Chest: Breath sounds normal. She has no wheezes. She has no rhonchi. She has no rales.   Abdominal: Abdomen is soft. Bowel sounds are normal. She exhibits no distension. There is abdominal tenderness in the right upper quadrant and epigastric area.   No right CVA tenderness.  No left CVA tenderness. There is no rebound, no guarding, no tenderness at McBurney's point and negative Holt's sign.   Musculoskeletal:         General: Normal range of motion.     Lymphadenopathy:     She has no cervical adenopathy.   Neurological: She is alert. She has normal strength. No cranial nerve deficit or sensory deficit.   Skin: Skin is warm and dry.   Psychiatric: She has a normal  mood and affect.         ED Course   Critical Care    Date/Time: 3/24/2025 9:19 PM    Performed by: Brenda Maurer PA-C  Authorized by: Manuel Ocampo MD  Direct patient critical care time: 15 minutes  Additional history critical care time: 5 minutes  Ordering / reviewing critical care time: 5 minutes  Documentation critical care time: 5 minutes  Consulting other physicians critical care time: 5 minutes  Total critical care time (exclusive of procedural time) : 35 minutes  Critical care time was exclusive of separately billable procedures and treating other patients.  Critical care was necessary to treat or prevent imminent or life-threatening deterioration of the following conditions: hepatic failure, metabolic crisis and dehydration.  Critical care was time spent personally by me on the following activities: development of treatment plan with patient or surrogate, discussions with consultants, evaluation of patient's response to treatment, examination of patient, ordering and performing treatments and interventions, obtaining history from patient or surrogate, ordering and review of laboratory studies, ordering and review of radiographic studies, pulse oximetry, re-evaluation of patient's condition and review of old charts.        Labs Reviewed   COMPREHENSIVE METABOLIC PANEL - Abnormal       Result Value    Sodium 140      Potassium 4.0      Chloride 107      CO2 24      Glucose 142 (*)     BUN 15      Creatinine 0.9      Calcium 10.0      Protein Total 7.9      Albumin 4.1      Bilirubin Total 0.4             (*)      (*)     Anion Gap 9      eGFR >60     LIPASE - Abnormal    Lipase Level 303 (*)    URINALYSIS, REFLEX TO URINE CULTURE - Abnormal    Color, UA Yellow      Appearance, UA Clear      pH, UA 6.0      Spec Grav UA 1.025      Protein, UA 1+ (*)     Glucose, UA Trace (*)     Ketones, UA Negative      Bilirubin, UA Negative      Blood, UA Negative      Nitrites, UA  Negative      Urobilinogen, UA 2.0-3.0 (*)     Leukocyte Esterase, UA 2+ (*)    URINALYSIS MICROSCOPIC - Abnormal    RBC, UA 1      WBC, UA 7 (*)     Bacteria, UA Rare      Squamous Epithelial Cells, UA 3      Hyaline Casts, UA 0      Calcium Oxalate Crystals, UA Rare      Microscopic Comment       MAGNESIUM - Normal    Magnesium  1.9     CBC WITH DIFFERENTIAL - Normal    WBC 7.00      RBC 4.24      HGB 13.1      HCT 38.6      MCV 91      MCH 30.9      MCHC 33.9      RDW 13.1      Platelet Count 280      MPV 10.0      Nucleated RBC 0      Neut % 48.3      Lymph % 39.4      Mono % 7.0      Eos % 3.6      Basophil % 1.4      Imm Grans % 0.3      Neut # 3.38      Lymph # 2.76      Mono # 0.49      Eos # 0.25      Baso # 0.10      Imm Grans # 0.02     C. DIFFICILE BY RAPID PCR   CULTURE, STOOL   CBC W/ AUTO DIFFERENTIAL    Narrative:     The following orders were created for panel order CBC W/ AUTO DIFFERENTIAL.  Procedure                               Abnormality         Status                     ---------                               -----------         ------                     CBC with Differential[[id number]]       Normal              Final result                 Please view results for these tests on the individual orders.   WBC, STOOL   STOOL EXAM-OVA,CYSTS,PARASITES   POCT GLUCOSE MONITORING CONTINUOUS          Imaging Results              CT Abdomen Pelvis With IV Contrast NO Oral Contrast (Final result)  Result time 03/24/25 19:56:36      Final result by Crystal Elder MD (03/24/25 19:56:36)                   Impression:      1. No acute intra-abdominal abnormalities identified.  2. Cholelithiasis.  3. Hepatosplenomegaly with possible hepatic steatosis.  4. Additional findings as detailed above.      Electronically signed by: Crystal Elder MD  Date:    03/24/2025  Time:    19:56               Narrative:    EXAMINATION:  CT ABDOMEN PELVIS WITH IV CONTRAST    CLINICAL HISTORY:  Epigastric  pain;    TECHNIQUE:  Low dose axial images, sagittal and coronal reformations were obtained from the lung bases to the pubic symphysis following the IV administration of 100 mL of Omnipaque 350 .  Oral contrast was not given.    COMPARISON:  None.    FINDINGS:  The visualized portion of the heart is unremarkable.  The lung bases are clear.    Liver is enlarged measuring 21 cm and diffusely hypoattenuating in appearance which is most suggestive for diffuse fatty infiltration.  Spleen is enlarged measuring 13.5 cm.  There is no intra-or extrahepatic biliary ductal dilatation.  Large calcified stone is seen in the gallbladder.  The stomach, pancreas, and adrenal glands are unremarkable.    Kidneys enhance normally with no evidence of hydronephrosis.  There is diffuse bilateral lobular contour of the kidneys which could reflect possible mild cortical scarring.  Two small right renal hypodensities, probable small cysts are seen.  No abnormalities are seen along the ureteral courses.  Urinary bladder is nondistended.  Uterus is unremarkable.    Appendix is visualized and is unremarkable.  The visualized loops of small and large bowel show no evidence of obstruction or inflammation.  No free air or free fluid.    Aorta tapers normally with moderate calcified and noncalcified plaque.  Abdominal aortic branch vessels and iliacs are patent.    No acute osseous abnormality identified. Subcutaneous soft tissues show no significant abnormalities.                                       Medications   cefTRIAXone injection 1 g (has no administration in time range)   lactated ringers bolus 1,000 mL (0 mLs Intravenous Stopped 3/24/25 2020)   morphine injection 4 mg (4 mg Intravenous Given 3/24/25 1837)   famotidine (PF) injection 20 mg (20 mg Intravenous Given 3/24/25 1837)   ondansetron injection 8 mg (8 mg Intravenous Given 3/24/25 1837)   iohexoL (OMNIPAQUE 350) injection 100 mL (100 mLs Intravenous Given 3/24/25 1909)   morphine  injection 2 mg (2 mg Intravenous Given 3/24/25 2032)   promethazine (PHENERGAN) 25 mg in 0.9% NaCl 50 mL IVPB (25 mg Intravenous New Bag 3/24/25 2037)     Medical Decision Making  51-year-old female with history of HTN HLD DM NAFLD presenting for evaluation of constant upper abdominal pain since yesterday with associated nausea and diarrhea.  Of note patient was found to have nausea remarkable for pancreatitis last week.  Was instructed to come to the ED via her primary care doctor if symptoms worsened.  Patient did not take her dose of Ozempic this week.  Last dose was last week.  Patient is afebrile nontoxic in no distress.  Exam above.  Has epigastric and right upper quadrant tenderness with negative Holt's sign.  No CVA tenderness.  CBC without leukocytosis or significant anemia.  CMP with no renal insufficiency or significant electrolyte abnormality.  Does have transaminitis 212/177 AST/ALT.  Lipase 303.  Mag phos normal. UA with 7 WBCs and 2+ leuks. Rocephin given for poss UTI.     Pt having persisting pain and nausea after IV morphine, pepcid and zofran  Additional morphine and phenergan ordered.   Anticipate possible hospitalization for intractable abdominal pain and nausea in pancreatitis  Low suspicion for acute cholecystitis.     She does have persisting RUQ and epigastric pain. US GB ordered and pending. Tbili normal. Considered but low suspicion for choledocholithiasis.     Also per chart review pt on gemfibrozil.   According to pt she is on zetia now. Consider review of medications for hepatotoxicity due to elevated liver enzymes.     Discussed with Dr. Strickland who accepts pt for observation for intractable abdominal pain 2/2 pancreatitis.     Discussed with Dr. Ocampo who also evaluated pt face to face and agrees with assessment and plan.       Amount and/or Complexity of Data Reviewed  Radiology: ordered.    Risk  Prescription drug management.                                      Clinical  Impression:  Final diagnoses:  [R11.2] Nausea and vomiting  [K85.30] Drug-induced acute pancreatitis, unspecified complication status (Primary)  [R74.01] Transaminitis  [R10.9] Intractable abdominal pain          ED Disposition Condition    Observation Stable                    [1]   Social History  Tobacco Use    Smoking status: Some Days     Current packs/day: 0.00     Types: Cigarettes     Last attempt to quit: 2012     Years since quittin.3   Substance Use Topics    Alcohol use: No    Drug use: No        Brenda Maurer PA-C  [phone]

## 2025-03-25 NOTE — ED NOTES
Report received from LINDA Szymanski. Pt resting comfortably. No signs of distress noted and no complaints/concerns from pt. Pt was reminded of the need of a stool specimen and was instructed to contact staff when she feels she can provide one

## 2025-03-25 NOTE — HPI
Kenna Robert is a 59 y.o. female with  BMI 35, MASH, Non-Insulin-dependent DM2, HTN, and HLD who presented to [facility] ED on 03/24/2025 for eval and treatment of intractable abdo pain.  They describe their pain as cramping, 10/10, located in the epigastric area without radiation.  It started 2 weeks ago and has been worsening since, with symptom frequency now constant.  She's also concerned that she is having constant diarrhea, as she has previously been constipated while on Ozempic.  There is associated N/V.  They deny associated fever, chills, chest pain, dyspnea, urinary sxs.  Symptoms are alleviated by none of the OTC meds she's tried.  Symptoms are worsened by movement and eating.  She's been on Ozempic for the past 8 months (reports minimal benefits noticed), and was told by her PCP to pause it given her sxs and outpatient labs showing pancreatitis last week.    ED course: lipase 303. LFTs also elevated 200s AST/177 ALT.  Hypertensive on arrival but VS otherwise unremarkable.  Exam with general abdo tenderness worse in upper abdomen.  US: Cholelithiasis with small amount of gallbladder sludge.  No ultrasonographic evidence to suggest acute cholecystitis.  ED therapy measures: morphine, zofran and phenergan.  They were placed in observation under the care of [facility] for further evaluation and treatment.

## 2025-03-25 NOTE — SUBJECTIVE & OBJECTIVE
Interval History: VSS, AF. Reports pain is somewhat improved today. Labwork concerning for gallstone pancreatitis. Gen Surg consulted.    Review of Systems   Constitutional:  Negative for activity change, appetite change, chills, fatigue and fever.   Respiratory:  Negative for cough and shortness of breath.    Cardiovascular:  Negative for chest pain, palpitations and leg swelling.   Gastrointestinal:  Positive for abdominal pain and nausea. Negative for constipation and vomiting.   Neurological:  Negative for dizziness, syncope, weakness and light-headedness.     Objective:     Vital Signs (Most Recent):  Temp: 98 °F (36.7 °C) (03/25/25 1202)  Pulse: 78 (03/25/25 1202)  Resp: 18 (03/25/25 1202)  BP: (!) 165/67 (03/25/25 1202)  SpO2: 99 % (03/25/25 1202) Vital Signs (24h Range):  Temp:  [97.9 °F (36.6 °C)-98.6 °F (37 °C)] 98 °F (36.7 °C)  Pulse:  [73-87] 78  Resp:  [16-18] 18  SpO2:  [93 %-99 %] 99 %  BP: (131-171)/(64-85) 165/67     Weight: 93 kg (205 lb 0.4 oz)  Body mass index is 35.19 kg/m².    Intake/Output Summary (Last 24 hours) at 3/25/2025 1222  Last data filed at 3/25/2025 0900  Gross per 24 hour   Intake 1290 ml   Output --   Net 1290 ml         Physical Exam  Vitals and nursing note reviewed.   Constitutional:       Appearance: She is obese.   HENT:      Head: Normocephalic.      Mouth/Throat:      Pharynx: Oropharynx is clear.   Eyes:      Conjunctiva/sclera: Conjunctivae normal.   Cardiovascular:      Rate and Rhythm: Normal rate and regular rhythm.      Pulses: Normal pulses.   Pulmonary:      Effort: Pulmonary effort is normal.   Abdominal:      General: There is no distension.      Palpations: Abdomen is soft.      Tenderness: There is abdominal tenderness in the periumbilical area. There is no right CVA tenderness, left CVA tenderness or guarding.   Skin:     General: Skin is warm and dry.   Neurological:      General: No focal deficit present.      Mental Status: She is alert. Mental status is at  baseline.   Psychiatric:         Mood and Affect: Mood normal.         Behavior: Behavior normal.               Significant Labs: All pertinent labs within the past 24 hours have been reviewed.    Significant Imaging: I have reviewed all pertinent imaging results/findings within the past 24 hours.

## 2025-03-25 NOTE — SUBJECTIVE & OBJECTIVE
Past Medical History:   Diagnosis Date    Hypercholesteremia     Hypertension     Thyroid disease        Past Surgical History:   Procedure Laterality Date    TUBAL LIGATION         Review of patient's allergies indicates:   Allergen Reactions    Lisinopril Other (See Comments)     Bad cough  Unspecified    Statins-hmg-coa reductase inhibitors Other (See Comments)     Muscle aches  unspecified       No current facility-administered medications on file prior to encounter.     Current Outpatient Medications on File Prior to Encounter   Medication Sig    albuterol (PROVENTIL/VENTOLIN HFA) 90 mcg/actuation inhaler Inhale 1-2 puffs into the lungs every 6 (six) hours as needed for Wheezing or Shortness of Breath. Rescue    butalbital-acetaminophen-caffeine -40 mg (FIORICET, ESGIC) -40 mg per tablet Take 2 tablets by mouth every 8 (eight) hours as needed for Headaches.    citalopram (CELEXA) 10 MG tablet Take 20 mg by mouth once daily.     famotidine (PEPCID) 20 MG tablet Take 2 tablets (40 mg total) by mouth 2 (two) times daily.    fluticasone (FLONASE) 50 mcg/actuation nasal spray 1 spray (50 mcg total) by Each Nare route 2 (two) times daily as needed for Rhinitis.    furosemide (LASIX) 20 MG tablet Take 20 mg by mouth 2 (two) times daily.    gemfibrozil (LOPID) 600 MG tablet Take 600 mg by mouth 2 (two) times daily before meals.    ibuprofen (ADVIL,MOTRIN) 600 MG tablet Take 1 tablet (600 mg total) by mouth every 6 (six) hours as needed for Pain (Take with food as needed for mild-to-moderate pain).    levothyroxine (SYNTHROID) 100 MCG tablet Take 100 mcg by mouth once daily.      metformin (GLUCOPHAGE) 1000 MG tablet Take 1,000 mg by mouth 2 (two) times daily with meals.    naproxen (NAPROSYN) 500 MG tablet Take 1 tablet (500 mg total) by mouth 2 (two) times daily.    omeprazole (PRILOSEC) 20 MG capsule Take 20 mg by mouth once daily.    ondansetron (ZOFRAN-ODT) 4 MG TbDL Take 1 tablet (4 mg total) by mouth  every 6 (six) hours as needed (nausea).     Family History    None       Tobacco Use    Smoking status: Some Days     Current packs/day: 0.00     Types: Cigarettes     Last attempt to quit: 2012     Years since quittin.4    Smokeless tobacco: Not on file   Substance and Sexual Activity    Alcohol use: No    Drug use: No    Sexual activity: Not on file     Review of Systems   Reason unable to perform ROS: ROS was performed and pertinent +s and -s are listed in HPI.     Objective:     Vital Signs (Most Recent):  Temp: 98 °F (36.7 °C) (25)  Pulse: 75 (25)  Resp: 18 (25)  BP: (!) 171/81 (25)  SpO2: 95 % (25) Vital Signs (24h Range):  Temp:  [98 °F (36.7 °C)-98.4 °F (36.9 °C)] 98 °F (36.7 °C)  Pulse:  [73-87] 75  Resp:  [16-18] 18  SpO2:  [95 %-96 %] 95 %  BP: (141-171)/(67-85) 171/81     Weight: 93 kg (205 lb)  Body mass index is 35.19 kg/m².     Physical Exam  Constitutional:       General: She is not in acute distress.     Appearance: Normal appearance. She is obese. She is not ill-appearing.   HENT:      Head: Normocephalic.   Neck:      Comments: JVP not elevated  Cardiovascular:      Rate and Rhythm: Normal rate and regular rhythm.      Pulses: Normal pulses.      Heart sounds: Normal heart sounds.   Pulmonary:      Effort: Pulmonary effort is normal.      Breath sounds: Normal breath sounds.   Abdominal:      General: Abdomen is flat. Bowel sounds are normal.      Tenderness: There is abdominal tenderness. There is guarding. There is no right CVA tenderness, left CVA tenderness or rebound.   Musculoskeletal:      Right lower leg: No edema.      Left lower leg: No edema.   Skin:     General: Skin is warm and dry.      Capillary Refill: Capillary refill takes less than 2 seconds.      Coloration: Skin is not jaundiced.   Neurological:      General: No focal deficit present.      Mental Status: She is alert and oriented to person, place, and time.    Psychiatric:         Mood and Affect: Mood normal.         Behavior: Behavior normal.                Significant Labs: All pertinent labs within the past 24 hours have been reviewed.  Bilirubin:   Recent Labs   Lab 03/09/25  2201 03/24/25  1815   BILITOT 0.3 0.4     CBC:   Recent Labs   Lab 03/24/25  1815   WBC 7.00   HGB 13.1   HCT 38.6        CMP:   Recent Labs   Lab 03/24/25  1815      K 4.0      CO2 24   BUN 15   CREATININE 0.9   CALCIUM 10.0   ALBUMIN 4.1   BILITOT 0.4   ALKPHOS 111   *   *   ANIONGAP 9     Urine Studies:   Recent Labs   Lab 03/24/25 1810   COLORU Yellow   APPEARANCEUA Clear   PHUR 6.0   SPECGRAV 1.025   PROTEINUA 1+*   GLUCUA Trace*   BILIRUBINUA Negative   OCCULTUA Negative   NITRITE Negative   UROBILINOGEN 2.0-3.0*   LEUKOCYTESUR 2+*   RBCUA 1   WBCUA 7*   BACTERIA Rare   HYALINECASTS 0       Significant Imaging: I have reviewed all pertinent imaging results/findings within the past 24 hours.    Results for orders placed or performed during the hospital encounter of 03/24/25 (from the past 2160 hours)   CT Abdomen Pelvis With IV Contrast NO Oral Contrast    Narrative    EXAMINATION:  CT ABDOMEN PELVIS WITH IV CONTRAST    CLINICAL HISTORY:  Epigastric pain;    TECHNIQUE:  Low dose axial images, sagittal and coronal reformations were obtained from the lung bases to the pubic symphysis following the IV administration of 100 mL of Omnipaque 350 .  Oral contrast was not given.    COMPARISON:  None.    FINDINGS:  The visualized portion of the heart is unremarkable.  The lung bases are clear.    Liver is enlarged measuring 21 cm and diffusely hypoattenuating in appearance which is most suggestive for diffuse fatty infiltration.  Spleen is enlarged measuring 13.5 cm.  There is no intra-or extrahepatic biliary ductal dilatation.  Large calcified stone is seen in the gallbladder.  The stomach, pancreas, and adrenal glands are unremarkable.    Kidneys enhance  normally with no evidence of hydronephrosis.  There is diffuse bilateral lobular contour of the kidneys which could reflect possible mild cortical scarring.  Two small right renal hypodensities, probable small cysts are seen.  No abnormalities are seen along the ureteral courses.  Urinary bladder is nondistended.  Uterus is unremarkable.    Appendix is visualized and is unremarkable.  The visualized loops of small and large bowel show no evidence of obstruction or inflammation.  No free air or free fluid.    Aorta tapers normally with moderate calcified and noncalcified plaque.  Abdominal aortic branch vessels and iliacs are patent.    No acute osseous abnormality identified. Subcutaneous soft tissues show no significant abnormalities.      Impression    1. No acute intra-abdominal abnormalities identified.  2. Cholelithiasis.  3. Hepatosplenomegaly with possible hepatic steatosis.  4. Additional findings as detailed above.      Electronically signed by: Crystal Elder MD  Date:    03/24/2025  Time:    19:56

## 2025-03-25 NOTE — PLAN OF CARE
Pt alert able to make needs known, tolerates medications well by mouth/ crushed, Fluids remain in progress, Repositioned self q 2hrs, pain controlled by PRN pain medication, plan of care explained. PT placed on NPO pre MRCP. Pt denies n,v,d, remains free from falls and hospital acquired pressure injuries, safety maintained. Will continue following plan of care.

## 2025-03-25 NOTE — ASSESSMENT & PLAN NOTE
Elevated lipase  Epigastric pain    59F w BMI 35 p/w several weeks of upper abdo pain.  Labs w elevated lipase and liver enzymes.  Gallstones on US.  Pancrease appears normal on CT.  Favor gallstone-driven pathology over primary pancreatitis.    Place in Obs  GI consult  NPO at [facility] in anticipation of MRCP  Next steps ([facility] at [facility]- vs [facility]) per MRCP result  Pain control  IV fluids

## 2025-03-25 NOTE — DISCHARGE INSTRUCTIONS
Discontinue Gemfibrozil due to elevated liver enzymes      Our goal at [facility] is to always give you outstanding care and exceptional service. You may receive a survey from [employer] by mail, text or e-mail in the next 24-48 hours asking about the care you received with us. The survey should only take 5-10 minutes to complete and is very important to us.     Your feedback provides us with a way to recognize our staff who work tirelessly to provide the best care! Also, your responses help us learn how to improve when your experience was below our aspiration of excellence. We are always looking for ways to improve your stay. We WILL use your feedback to continue making improvements to help us provide the highest quality care. We keep your personal information and feedback confidential. We appreciate your time completing this survey and can't wait to hear from you!!!    We look forward to your continued care with us! Thanks so much for choosing [facility] for your healthcare needs!

## 2025-03-26 ENCOUNTER — ANESTHESIA (OUTPATIENT)
Dept: SURGERY | Facility: HOSPITAL | Age: 60
DRG: 417 | End: 2025-03-26
Payer: MEDICAID

## 2025-03-26 ENCOUNTER — ANESTHESIA EVENT (OUTPATIENT)
Dept: SURGERY | Facility: HOSPITAL | Age: 60
DRG: 417 | End: 2025-03-26
Payer: MEDICAID

## 2025-03-26 PROBLEM — R74.8 ELEVATED LIPASE: Status: RESOLVED | Noted: 2025-03-25 | Resolved: 2025-03-26

## 2025-03-26 PROBLEM — K80.20 CALCULUS OF GALLBLADDER: Status: RESOLVED | Noted: 2025-03-25 | Resolved: 2025-03-26

## 2025-03-26 PROBLEM — R10.13 EPIGASTRIC PAIN: Status: RESOLVED | Noted: 2025-03-25 | Resolved: 2025-03-26

## 2025-03-26 LAB
ABSOLUTE EOSINOPHIL (OHS): 0.26 K/UL
ABSOLUTE MONOCYTE (OHS): 0.32 K/UL (ref 0.3–1)
ABSOLUTE NEUTROPHIL COUNT (OHS): 1.93 K/UL (ref 1.8–7.7)
ALBUMIN SERPL BCP-MCNC: 3.1 G/DL (ref 3.5–5.2)
ALP SERPL-CCNC: 87 UNIT/L (ref 40–150)
ALT SERPL W/O P-5'-P-CCNC: 82 UNIT/L (ref 10–44)
ANION GAP (OHS): 6 MMOL/L (ref 8–16)
AST SERPL-CCNC: 57 UNIT/L (ref 11–45)
BASOPHILS # BLD AUTO: 0.05 K/UL
BASOPHILS NFR BLD AUTO: 0.9 %
BILIRUB SERPL-MCNC: 0.2 MG/DL (ref 0.1–1)
BUN SERPL-MCNC: 9 MG/DL (ref 6–20)
CALCIUM SERPL-MCNC: 9.2 MG/DL (ref 8.7–10.5)
CHLORIDE SERPL-SCNC: 109 MMOL/L (ref 95–110)
CO2 SERPL-SCNC: 27 MMOL/L (ref 23–29)
CREAT SERPL-MCNC: 0.8 MG/DL (ref 0.5–1.4)
ERYTHROCYTE [DISTWIDTH] IN BLOOD BY AUTOMATED COUNT: 13 % (ref 11.5–14.5)
GFR SERPLBLD CREATININE-BSD FMLA CKD-EPI: >60 ML/MIN/1.73/M2
GLUCOSE SERPL-MCNC: 146 MG/DL (ref 70–110)
HCT VFR BLD AUTO: 34.3 % (ref 37–48.5)
HGB BLD-MCNC: 11.4 GM/DL (ref 12–16)
IMM GRANULOCYTES # BLD AUTO: 0.02 K/UL (ref 0–0.04)
IMM GRANULOCYTES NFR BLD AUTO: 0.4 % (ref 0–0.5)
LYMPHOCYTES # BLD AUTO: 3.11 K/UL (ref 1–4.8)
MAGNESIUM SERPL-MCNC: 1.5 MG/DL (ref 1.6–2.6)
MCH RBC QN AUTO: 30.9 PG (ref 27–50)
MCHC RBC AUTO-ENTMCNC: 33.2 G/DL (ref 32–36)
MCV RBC AUTO: 93 FL (ref 82–98)
NUCLEATED RBC (/100WBC) (OHS): 0 /100 WBC
PHOSPHATE SERPL-MCNC: 2.7 MG/DL (ref 2.7–4.5)
PLATELET # BLD AUTO: 225 K/UL (ref 150–450)
PMV BLD AUTO: 10 FL (ref 9.2–12.9)
POTASSIUM SERPL-SCNC: 3.9 MMOL/L (ref 3.5–5.1)
PROT SERPL-MCNC: 6.1 GM/DL (ref 6–8.4)
RBC # BLD AUTO: 3.69 M/UL (ref 4–5.4)
RELATIVE EOSINOPHIL (OHS): 4.6 %
RELATIVE LYMPHOCYTE (OHS): 54.7 % (ref 18–48)
RELATIVE MONOCYTE (OHS): 5.6 % (ref 4–15)
RELATIVE NEUTROPHIL (OHS): 33.8 % (ref 38–73)
SODIUM SERPL-SCNC: 142 MMOL/L (ref 136–145)
WBC # BLD AUTO: 5.69 K/UL (ref 3.9–12.7)

## 2025-03-26 PROCEDURE — 84100 ASSAY OF PHOSPHORUS: CPT

## 2025-03-26 PROCEDURE — G0378 HOSPITAL OBSERVATION PER HR: HCPCS

## 2025-03-26 PROCEDURE — 96376 TX/PRO/DX INJ SAME DRUG ADON: CPT

## 2025-03-26 PROCEDURE — 63600175 PHARM REV CODE 636 W HCPCS: Performed by: SURGERY

## 2025-03-26 PROCEDURE — 88304 TISSUE EXAM BY PATHOLOGIST: CPT | Mod: 26,,, | Performed by: PATHOLOGY

## 2025-03-26 PROCEDURE — 63600175 PHARM REV CODE 636 W HCPCS: Mod: JZ,TB | Performed by: ANESTHESIOLOGY

## 2025-03-26 PROCEDURE — 25000003 PHARM REV CODE 250: Performed by: SURGERY

## 2025-03-26 PROCEDURE — 25000003 PHARM REV CODE 250: Performed by: NURSE ANESTHETIST, CERTIFIED REGISTERED

## 2025-03-26 PROCEDURE — 63600175 PHARM REV CODE 636 W HCPCS: Performed by: NURSE ANESTHETIST, CERTIFIED REGISTERED

## 2025-03-26 PROCEDURE — 47562 LAPAROSCOPIC CHOLECYSTECTOMY: CPT | Mod: ,,, | Performed by: SURGERY

## 2025-03-26 PROCEDURE — 63600175 PHARM REV CODE 636 W HCPCS: Performed by: STUDENT IN AN ORGANIZED HEALTH CARE EDUCATION/TRAINING PROGRAM

## 2025-03-26 PROCEDURE — 25000003 PHARM REV CODE 250

## 2025-03-26 PROCEDURE — 99232 SBSQ HOSP IP/OBS MODERATE 35: CPT | Mod: ,,, | Performed by: NURSE PRACTITIONER

## 2025-03-26 PROCEDURE — 85025 COMPLETE CBC W/AUTO DIFF WBC: CPT

## 2025-03-26 PROCEDURE — D9220A PRA ANESTHESIA: Mod: CRNA,,, | Performed by: STUDENT IN AN ORGANIZED HEALTH CARE EDUCATION/TRAINING PROGRAM

## 2025-03-26 PROCEDURE — 99900035 HC TECH TIME PER 15 MIN (STAT)

## 2025-03-26 PROCEDURE — 80053 COMPREHEN METABOLIC PANEL: CPT

## 2025-03-26 PROCEDURE — 37000008 HC ANESTHESIA 1ST 15 MINUTES: Performed by: SURGERY

## 2025-03-26 PROCEDURE — 83735 ASSAY OF MAGNESIUM: CPT

## 2025-03-26 PROCEDURE — 25000003 PHARM REV CODE 250: Performed by: STUDENT IN AN ORGANIZED HEALTH CARE EDUCATION/TRAINING PROGRAM

## 2025-03-26 PROCEDURE — D9220A PRA ANESTHESIA: Mod: ANES,,, | Performed by: ANESTHESIOLOGY

## 2025-03-26 PROCEDURE — 71000039 HC RECOVERY, EACH ADD'L HOUR: Performed by: SURGERY

## 2025-03-26 PROCEDURE — 11000001 HC ACUTE MED/SURG PRIVATE ROOM

## 2025-03-26 PROCEDURE — 96361 HYDRATE IV INFUSION ADD-ON: CPT

## 2025-03-26 PROCEDURE — 63600175 PHARM REV CODE 636 W HCPCS

## 2025-03-26 PROCEDURE — 36000711: Performed by: SURGERY

## 2025-03-26 PROCEDURE — 36000710: Performed by: SURGERY

## 2025-03-26 PROCEDURE — 71000033 HC RECOVERY, INTIAL HOUR: Performed by: SURGERY

## 2025-03-26 PROCEDURE — 36415 COLL VENOUS BLD VENIPUNCTURE: CPT

## 2025-03-26 PROCEDURE — 27201423 OPTIME MED/SURG SUP & DEVICES STERILE SUPPLY: Performed by: SURGERY

## 2025-03-26 PROCEDURE — 63600175 PHARM REV CODE 636 W HCPCS: Performed by: ANESTHESIOLOGY

## 2025-03-26 PROCEDURE — 37000009 HC ANESTHESIA EA ADD 15 MINS: Performed by: SURGERY

## 2025-03-26 PROCEDURE — 88304 TISSUE EXAM BY PATHOLOGIST: CPT | Mod: TC | Performed by: SURGERY

## 2025-03-26 PROCEDURE — 0FT44ZZ RESECTION OF GALLBLADDER, PERCUTANEOUS ENDOSCOPIC APPROACH: ICD-10-PCS | Performed by: SURGERY

## 2025-03-26 PROCEDURE — 94761 N-INVAS EAR/PLS OXIMETRY MLT: CPT

## 2025-03-26 PROCEDURE — 8E0W4CZ ROBOTIC ASSISTED PROCEDURE OF TRUNK REGION, PERCUTANEOUS ENDOSCOPIC APPROACH: ICD-10-PCS | Performed by: SURGERY

## 2025-03-26 RX ORDER — LIDOCAINE HYDROCHLORIDE 20 MG/ML
INJECTION INTRAVENOUS
Status: DISCONTINUED | OUTPATIENT
Start: 2025-03-26 | End: 2025-03-26

## 2025-03-26 RX ORDER — ROCURONIUM BROMIDE 10 MG/ML
INJECTION, SOLUTION INTRAVENOUS
Status: DISCONTINUED | OUTPATIENT
Start: 2025-03-26 | End: 2025-03-26

## 2025-03-26 RX ORDER — BUPIVACAINE HYDROCHLORIDE 2.5 MG/ML
INJECTION, SOLUTION INFILTRATION; PERINEURAL
Status: DISCONTINUED | OUTPATIENT
Start: 2025-03-26 | End: 2025-03-26 | Stop reason: HOSPADM

## 2025-03-26 RX ORDER — CEFAZOLIN 2 G/1
2 INJECTION, POWDER, FOR SOLUTION INTRAMUSCULAR; INTRAVENOUS
Status: COMPLETED | OUTPATIENT
Start: 2025-03-26 | End: 2025-03-26

## 2025-03-26 RX ORDER — MIDAZOLAM HYDROCHLORIDE 1 MG/ML
INJECTION INTRAMUSCULAR; INTRAVENOUS
Status: DISCONTINUED | OUTPATIENT
Start: 2025-03-26 | End: 2025-03-26

## 2025-03-26 RX ORDER — FENTANYL CITRATE 50 UG/ML
INJECTION, SOLUTION INTRAMUSCULAR; INTRAVENOUS
Status: DISCONTINUED | OUTPATIENT
Start: 2025-03-26 | End: 2025-03-26

## 2025-03-26 RX ORDER — MORPHINE SULFATE 4 MG/ML
4 INJECTION, SOLUTION INTRAMUSCULAR; INTRAVENOUS EVERY 4 HOURS PRN
Status: CANCELLED | OUTPATIENT
Start: 2025-03-26

## 2025-03-26 RX ORDER — HYDROCODONE BITARTRATE AND ACETAMINOPHEN 5; 325 MG/1; MG/1
1 TABLET ORAL EVERY 6 HOURS PRN
Refills: 0 | Status: CANCELLED | OUTPATIENT
Start: 2025-03-26

## 2025-03-26 RX ORDER — KETOROLAC TROMETHAMINE 30 MG/ML
15 INJECTION, SOLUTION INTRAMUSCULAR; INTRAVENOUS ONCE
Status: COMPLETED | OUTPATIENT
Start: 2025-03-26 | End: 2025-03-26

## 2025-03-26 RX ORDER — ACETAMINOPHEN 10 MG/ML
1000 INJECTION, SOLUTION INTRAVENOUS ONCE
Status: COMPLETED | OUTPATIENT
Start: 2025-03-26 | End: 2025-03-26

## 2025-03-26 RX ORDER — EPHEDRINE SULFATE 50 MG/ML
INJECTION, SOLUTION INTRAVENOUS
Status: DISCONTINUED | OUTPATIENT
Start: 2025-03-26 | End: 2025-03-26

## 2025-03-26 RX ORDER — PROPOFOL 10 MG/ML
VIAL (ML) INTRAVENOUS
Status: DISCONTINUED | OUTPATIENT
Start: 2025-03-26 | End: 2025-03-26

## 2025-03-26 RX ORDER — GLUCAGON 1 MG
1 KIT INJECTION
Status: DISCONTINUED | OUTPATIENT
Start: 2025-03-26 | End: 2025-03-26 | Stop reason: HOSPADM

## 2025-03-26 RX ORDER — ONDANSETRON HYDROCHLORIDE 2 MG/ML
INJECTION, SOLUTION INTRAVENOUS
Status: DISCONTINUED | OUTPATIENT
Start: 2025-03-26 | End: 2025-03-26

## 2025-03-26 RX ORDER — DEXAMETHASONE SODIUM PHOSPHATE 4 MG/ML
INJECTION, SOLUTION INTRA-ARTICULAR; INTRALESIONAL; INTRAMUSCULAR; INTRAVENOUS; SOFT TISSUE
Status: DISCONTINUED | OUTPATIENT
Start: 2025-03-26 | End: 2025-03-26

## 2025-03-26 RX ORDER — INDOCYANINE GREEN AND WATER 25 MG
1.25 KIT INJECTION ONCE
Status: DISCONTINUED | OUTPATIENT
Start: 2025-03-26 | End: 2025-03-28 | Stop reason: HOSPADM

## 2025-03-26 RX ORDER — SODIUM CHLORIDE 0.9 % (FLUSH) 0.9 %
10 SYRINGE (ML) INJECTION
Status: DISCONTINUED | OUTPATIENT
Start: 2025-03-26 | End: 2025-03-26 | Stop reason: HOSPADM

## 2025-03-26 RX ORDER — METHOCARBAMOL 100 MG/ML
500 INJECTION, SOLUTION INTRAMUSCULAR; INTRAVENOUS ONCE AS NEEDED
Status: DISCONTINUED | OUTPATIENT
Start: 2025-03-26 | End: 2025-03-26 | Stop reason: HOSPADM

## 2025-03-26 RX ORDER — OXYCODONE AND ACETAMINOPHEN 5; 325 MG/1; MG/1
1 TABLET ORAL EVERY 6 HOURS PRN
Qty: 12 TABLET | Refills: 0 | Status: SHIPPED | OUTPATIENT
Start: 2025-03-26 | End: 2025-03-28

## 2025-03-26 RX ORDER — PHENYLEPHRINE HYDROCHLORIDE 10 MG/ML
INJECTION INTRAVENOUS
Status: DISCONTINUED | OUTPATIENT
Start: 2025-03-26 | End: 2025-03-26

## 2025-03-26 RX ORDER — INDOCYANINE GREEN AND WATER 25 MG
1.25 KIT INJECTION ONCE
Status: COMPLETED | OUTPATIENT
Start: 2025-03-26 | End: 2025-03-26

## 2025-03-26 RX ORDER — HYDROMORPHONE HYDROCHLORIDE 2 MG/ML
0.2 INJECTION, SOLUTION INTRAMUSCULAR; INTRAVENOUS; SUBCUTANEOUS EVERY 5 MIN PRN
Status: DISCONTINUED | OUTPATIENT
Start: 2025-03-26 | End: 2025-03-26 | Stop reason: HOSPADM

## 2025-03-26 RX ORDER — PROCHLORPERAZINE EDISYLATE 5 MG/ML
INJECTION INTRAMUSCULAR; INTRAVENOUS
Status: DISCONTINUED | OUTPATIENT
Start: 2025-03-26 | End: 2025-03-26

## 2025-03-26 RX ADMIN — MORPHINE SULFATE 2 MG: 4 INJECTION, SOLUTION INTRAMUSCULAR; INTRAVENOUS at 12:03

## 2025-03-26 RX ADMIN — MORPHINE SULFATE 2 MG: 4 INJECTION, SOLUTION INTRAMUSCULAR; INTRAVENOUS at 10:03

## 2025-03-26 RX ADMIN — EPHEDRINE SULFATE 10 MG: 50 INJECTION INTRAVENOUS at 02:03

## 2025-03-26 RX ADMIN — CEFAZOLIN 2 G: 2 INJECTION, POWDER, FOR SOLUTION INTRAMUSCULAR; INTRAVENOUS at 02:03

## 2025-03-26 RX ADMIN — SUGAMMADEX 200 MG: 100 INJECTION, SOLUTION INTRAVENOUS at 03:03

## 2025-03-26 RX ADMIN — SODIUM CHLORIDE, POTASSIUM CHLORIDE, SODIUM LACTATE AND CALCIUM CHLORIDE: 600; 310; 30; 20 INJECTION, SOLUTION INTRAVENOUS at 04:03

## 2025-03-26 RX ADMIN — LEVOTHYROXINE SODIUM 100 MCG: 0.1 TABLET ORAL at 05:03

## 2025-03-26 RX ADMIN — ACETAMINOPHEN 1000 MG: 10 INJECTION, SOLUTION INTRAVENOUS at 04:03

## 2025-03-26 RX ADMIN — SODIUM CHLORIDE: 0.9 INJECTION, SOLUTION INTRAVENOUS at 02:03

## 2025-03-26 RX ADMIN — INDOCYANINE GREEN AND WATER 1.25 MG: KIT at 08:03

## 2025-03-26 RX ADMIN — PHENYLEPHRINE HYDROCHLORIDE 100 MCG: 10 INJECTION INTRAVENOUS at 02:03

## 2025-03-26 RX ADMIN — FENTANYL CITRATE 50 MCG: 50 INJECTION, SOLUTION INTRAMUSCULAR; INTRAVENOUS at 03:03

## 2025-03-26 RX ADMIN — MIDAZOLAM HYDROCHLORIDE 2 MG: 1 INJECTION INTRAMUSCULAR; INTRAVENOUS at 01:03

## 2025-03-26 RX ADMIN — PANTOPRAZOLE SODIUM 40 MG: 40 TABLET, DELAYED RELEASE ORAL at 08:03

## 2025-03-26 RX ADMIN — KETOROLAC TROMETHAMINE 15 MG: 30 INJECTION, SOLUTION INTRAMUSCULAR; INTRAVENOUS at 04:03

## 2025-03-26 RX ADMIN — LIDOCAINE HYDROCHLORIDE 100 MG: 20 INJECTION, SOLUTION INTRAVENOUS at 02:03

## 2025-03-26 RX ADMIN — FUROSEMIDE 20 MG: 20 TABLET ORAL at 08:03

## 2025-03-26 RX ADMIN — FENTANYL CITRATE 100 MCG: 50 INJECTION, SOLUTION INTRAMUSCULAR; INTRAVENOUS at 02:03

## 2025-03-26 RX ADMIN — FUROSEMIDE 20 MG: 20 TABLET ORAL at 05:03

## 2025-03-26 RX ADMIN — SODIUM CHLORIDE, POTASSIUM CHLORIDE, SODIUM LACTATE AND CALCIUM CHLORIDE: 600; 310; 30; 20 INJECTION, SOLUTION INTRAVENOUS at 09:03

## 2025-03-26 RX ADMIN — MORPHINE SULFATE 2 MG: 4 INJECTION, SOLUTION INTRAMUSCULAR; INTRAVENOUS at 07:03

## 2025-03-26 RX ADMIN — CITALOPRAM HYDROBROMIDE 20 MG: 20 TABLET ORAL at 08:03

## 2025-03-26 RX ADMIN — DEXAMETHASONE SODIUM PHOSPHATE 8 MG: 4 INJECTION, SOLUTION INTRAMUSCULAR; INTRAVENOUS at 02:03

## 2025-03-26 RX ADMIN — ALUMINUM HYDROXIDE, MAGNESIUM HYDROXIDE, AND DIMETHICONE 30 ML: 200; 20; 200 SUSPENSION ORAL at 10:03

## 2025-03-26 RX ADMIN — PROCHLORPERAZINE EDISYLATE 5 MG: 5 INJECTION INTRAMUSCULAR; INTRAVENOUS at 02:03

## 2025-03-26 RX ADMIN — ONDANSETRON 4 MG: 2 INJECTION, SOLUTION INTRAMUSCULAR; INTRAVENOUS at 02:03

## 2025-03-26 RX ADMIN — PROPOFOL 150 MG: 10 INJECTION, EMULSION INTRAVENOUS at 02:03

## 2025-03-26 RX ADMIN — SODIUM CHLORIDE, POTASSIUM CHLORIDE, SODIUM LACTATE AND CALCIUM CHLORIDE: 600; 310; 30; 20 INJECTION, SOLUTION INTRAVENOUS at 12:03

## 2025-03-26 RX ADMIN — ROCURONIUM BROMIDE 50 MG: 10 INJECTION INTRAVENOUS at 02:03

## 2025-03-26 RX ADMIN — EPHEDRINE SULFATE 15 MG: 50 INJECTION INTRAVENOUS at 02:03

## 2025-03-26 RX ADMIN — LOSARTAN POTASSIUM 25 MG: 25 TABLET, FILM COATED ORAL at 08:03

## 2025-03-26 NOTE — ANESTHESIA POSTPROCEDURE EVALUATION
Anesthesia Post Evaluation    Patient: Kenna Robert    Procedure(s) Performed: Procedure(s) (LRB):  ROBOTIC CHOLECYSTECTOMY (N/A)    Final Anesthesia Type: general      Patient location during evaluation: PACU  Patient participation: Yes- Able to Participate  Level of consciousness: awake and alert and oriented  Post-procedure vital signs: reviewed and stable  Pain management: adequate  Airway patency: patent    PONV status at discharge: No PONV  Anesthetic complications: no      Cardiovascular status: blood pressure returned to baseline and hemodynamically stable  Respiratory status: unassisted, spontaneous ventilation and room air  Hydration status: euvolemic  Follow-up not needed.              Vitals Value Taken Time   /57 03/26/25 16:31   Temp 36.4 °C (97.6 °F) 03/26/25 16:30   Pulse 95 03/26/25 16:44   Resp 19 03/26/25 16:44   SpO2 97 % 03/26/25 16:44   Vitals shown include unfiled device data.      Event Time   Out of Recovery 03/26/2025 16:51:52         Pain/Chriss Score: Pain Rating Prior to Med Admin: 0 (3/26/2025  4:15 PM)  Chriss Score: 9 (3/26/2025  4:15 PM)

## 2025-03-26 NOTE — PLAN OF CARE
03/25/25 0750   Discharge Planning   Assessment Type Discharge Planning Brief Assessment   Resource/Environmental Concerns none   Support Systems Spouse/significant other;Children   Equipment Currently Used at Home none   Current Living Arrangements home   Patient/Family Anticipates Transition to home with family   Patient/Family Anticipated Services at Transition none   DME Needed Upon Discharge  none   Discharge Plan A Home with family  (with instructions to follow up)       [id number] - [place] - [address]  [address] [address]  Phone: [phone] Fax: [phone]

## 2025-03-26 NOTE — PROGRESS NOTES
[facility]  Progress Note    Patient Name: Kenna Robert  MRN: [id number]  Patient Class: OP- Observation   Admission Date: 3/24/2025  Length of Stay: 0 days  Attending Physician: Moe Clayton MD  Primary Care Provider: Gio Elizabeth MD        Subjective     Principal Problem:Calculus of gallbladder        HPI:  Kenna Robert is a 59 y.o. female with  BMI 35, MASH, Non-Insulin-dependent DM2, HTN, and HLD who presented to [facility] ED on 03/24/2025 for eval and treatment of intractable abdo pain.  They describe their pain as cramping, 10/10, located in the epigastric area without radiation.  It started 2 weeks ago and has been worsening since, with symptom frequency now constant.  She's also concerned that she is having constant diarrhea, as she has previously been constipated while on Ozempic.  There is associated N/V.  They deny associated fever, chills, chest pain, dyspnea, urinary sxs.  Symptoms are alleviated by none of the OTC meds she's tried.  Symptoms are worsened by movement and eating.  She's been on Ozempic for the past 8 months (reports minimal benefits noticed), and was told by her PCP to pause it given her sxs and outpatient labs showing pancreatitis last week.    ED course: lipase 303. LFTs also elevated 200s AST/177 ALT.  Hypertensive on arrival but VS otherwise unremarkable.  Exam with general abdo tenderness worse in upper abdomen.  US: Cholelithiasis with small amount of gallbladder sludge.  No ultrasonographic evidence to suggest acute cholecystitis.  ED therapy measures: morphine, zofran and phenergan.  They were placed in observation under the care of [facility] for further evaluation and treatment.      Overview/Hospital Course:  No notes on file    Interval History: VSS, AF. Continuing to report periumbilical pain and new onset R flank pain. Plan for lap parish today vs. tomorrow.       Review of Systems   Constitutional:  Negative for  activity change, appetite change, chills, fatigue and fever.   Respiratory:  Negative for cough and shortness of breath.    Cardiovascular:  Negative for chest pain, palpitations and leg swelling.   Gastrointestinal:  Positive for abdominal pain and nausea. Negative for constipation and vomiting.   Neurological:  Negative for dizziness, syncope, weakness and light-headedness.     Objective:     Vital Signs (Most Recent):  Temp: 98 °F (36.7 °C) (03/26/25 0801)  Pulse: 72 (03/26/25 0801)  Resp: 18 (03/26/25 0828)  BP: (!) 160/82 (03/26/25 0801)  SpO2: 98 % (03/26/25 0828) Vital Signs (24h Range):  Temp:  [97.9 °F (36.6 °C)-98.5 °F (36.9 °C)] 98 °F (36.7 °C)  Pulse:  [66-78] 72  Resp:  [18-20] 18  SpO2:  [95 %-99 %] 98 %  BP: (131-175)/(60-83) 160/82     Weight: 93 kg (205 lb 0.4 oz)  Body mass index is 35.19 kg/m².  No intake or output data in the 24 hours ending 03/26/25 1149        Physical Exam  Vitals and nursing note reviewed.   Constitutional:       Appearance: She is obese.   HENT:      Head: Normocephalic.      Mouth/Throat:      Pharynx: Oropharynx is clear.   Eyes:      Conjunctiva/sclera: Conjunctivae normal.   Cardiovascular:      Rate and Rhythm: Normal rate and regular rhythm.      Pulses: Normal pulses.   Pulmonary:      Effort: Pulmonary effort is normal.   Abdominal:      General: There is no distension.      Palpations: Abdomen is soft.      Tenderness: There is abdominal tenderness in the periumbilical area. There is no right CVA tenderness, left CVA tenderness or guarding.   Skin:     General: Skin is warm and dry.   Neurological:      General: No focal deficit present.      Mental Status: She is alert. Mental status is at baseline.   Psychiatric:         Mood and Affect: Mood normal.         Behavior: Behavior normal.               Significant Labs: All pertinent labs within the past 24 hours have been reviewed.    Significant Imaging: I have reviewed all pertinent imaging results/findings within  the past 24 hours.      Assessment & Plan  Calculus of gallbladder  Elevated lipase  Epigastric pain    59F w BMI 35 p/w several weeks of upper abdo pain.  Labs w elevated lipase and liver enzymes.  Gallstones on US.  Pancrease appears normal on CT.  Favor gallstone-driven pathology over primary pancreatitis.    Place in Obs  GI consulted. Suspects gallstone pancreatitis vs. Cholelithiasis vs. Ozempic induced pancreatitis.  MRCP Cholelithiasis with no findings to suggest acute cholecystitis.  No evidence of intra or extrahepatic biliary ductal dilatation, choledocholithiasis, stricture, or mass.   Pain control  IV fluids @ 125 mL/hr  General surgery consulted. Plan for lap [facility] today.       Epigastric pain      Elevated lipase      VTE Risk Mitigation (From admission, onward)           Ordered     IP VTE HIGH RISK PATIENT  Once         03/24/25 2330     Place sequential compression device  Until discontinued         03/24/25 2330     Reason for No Pharmacological VTE Prophylaxis  Once        Question:  Reasons:  Answer:  Patient is Ambulatory    03/24/25 2330                    Discharge Planning   PEDRITO:      Code Status: Full Code   Medical Readiness for Discharge Date:   Discharge Plan A: Home with family (with instructions to follow up)                        Kathy Puri PA-C  Department of Hospital Medicine   [facility] [place] [facility]

## 2025-03-26 NOTE — CONSULTS
Consult Note    Inpatient consult to General Surgery  Consult performed by: Sherman Maurice MD  Consult ordered by: Kathy Puri PA-C        SUBJECTIVE:     History of Present Illness:  Patient is a 59 y.o. female presents with gallstones and ruq pain.  She reports the pain started about a week ago, also had started weight-loss drug that might have incited the pain.  Today she feels ok, but interested in surgery to remove gallbladder. We discussed etiology and possibility that she had gallstone pancreatitis and will remove gb to prevent recurrence.  Ultrasound shows gallstones with no obvious cholecystitis. MRCP obtained shows no cbd stones.      Review of patient's allergies indicates:   Allergen Reactions    Lisinopril Other (See Comments)     Bad cough  Unspecified    Statins-hmg-coa reductase inhibitors Other (See Comments)     Muscle aches  unspecified     Past Medical History:   Diagnosis Date    Hypercholesteremia     Hypertension     Thyroid disease      Past Surgical History:   Procedure Laterality Date    TUBAL LIGATION       No family history on file.  Social History[1]  Review of Systems   Constitutional:  Negative for chills and fever.   HENT:  Negative for congestion, hearing loss and sore throat.    Eyes: Negative.    Respiratory:  Negative for cough, shortness of breath and wheezing.    Cardiovascular:  Negative for chest pain and palpitations.   Gastrointestinal:  Positive for abdominal pain and nausea. Negative for constipation, heartburn and vomiting.   Musculoskeletal:  Negative for myalgias and neck pain.   Neurological:  Negative for dizziness, tingling and headaches.   All other systems reviewed and are negative.    OBJECTIVE:     Vital Signs:  Temp:  [98 °F (36.7 °C)-98.2 °F (36.8 °C)]   Pulse:  [69-72]   Resp:  [18]   BP: (143-160)/(78-83)   SpO2:  [95 %-98 %]     Physical Exam  Constitutional:       General: She is not in acute distress.     Appearance: She is well-developed. She is  obese. She is not diaphoretic.   HENT:      Head: Normocephalic and atraumatic.   Eyes:      Conjunctiva/sclera: Conjunctivae normal.      Pupils: Pupils are equal, round, and reactive to light.   Cardiovascular:      Rate and Rhythm: Normal rate and regular rhythm.      Pulses: Normal pulses.      Heart sounds: Normal heart sounds.   Pulmonary:      Effort: Pulmonary effort is normal. No respiratory distress.      Breath sounds: Normal breath sounds. No stridor. No wheezing.   Abdominal:      General: Bowel sounds are normal. There is no distension.      Palpations: Abdomen is soft.      Tenderness: There is abdominal tenderness.   Musculoskeletal:         General: Normal range of motion.      Cervical back: Normal range of motion and neck supple.   Skin:     General: Skin is warm and dry.      Findings: No rash.   Neurological:      Mental Status: She is alert and oriented to person, place, and time.      Cranial Nerves: No cranial nerve deficit.   Psychiatric:         Behavior: Behavior normal.       Laboratory:  I have reviewed all pertinent lab results within the past 24 hours.  CBC:   Recent Labs   Lab 03/26/25  0437   WBC 5.69   RBC 3.69*   HGB 11.4*   HCT 34.3*      MCV 93   MCH 30.9   MCHC 33.2     CMP:   Recent Labs   Lab 03/26/25  0437   CALCIUM 9.2   ALBUMIN 3.1*      K 3.9   CO2 27      BUN 9   CREATININE 0.8   ALKPHOS 87   ALT 82*   AST 57*   BILITOT 0.2       Diagnostic Results:  US: Reviewed  MRI: Reviewed  gallstones    MRCP  Impression:     1. Cholelithiasis with no findings to suggest acute cholecystitis.  No evidence of intra or extrahepatic biliary ductal dilatation, choledocholithiasis, stricture, or mass.  2. 0.7 cm cystic lesion of the pancreatic tail.  Follow-up MRI/MRCP without and with contrast is recommended in 1 year.  3. Hepatosplenomegaly.  4. Hepatic steatosis.    ultrasound  Impression:     Cholelithiasis with small amount of gallbladder sludge.  No  ultrasonographic evidence to suggest acute cholecystitis    ASSESSMENT/PLAN:     59 yr old WF w gallstones    Plan: to OR for robo [facility]  Risks and side effects discussed with the patient. Alterative therapies discussed. Patient agreeable to procedure and has signed consent which was discussed in detail.  .         [1]   Social History  Tobacco Use    Smoking status: Some Days     Current packs/day: 0.00     Types: Cigarettes     Last attempt to quit: 2012     Years since quittin.4   Substance Use Topics    Alcohol use: No    Drug use: No

## 2025-03-26 NOTE — PROGRESS NOTES
[facility] Progress Note    Patient Complaint: nausea, abdominal pain    PCP:   Gio Elizabeth       LOS: 0        Initial History of Present Illness (HPI):  This is a 59 y.o. female consulted to GI service for symptomatic cholecystitis. PMH BMI 35, MASH, Non-Insulin-dependent DM2, HTN, and HLD. Patient complaint of acute onset of epigastric abdominal pain with associated symptoms of nausea that began a week ago. Denies fever, sob, vomiting, hematemesis, diarrhea or constipation. Reports a history of GI problems, reports chronic nausea after food intake. Reports starting ozempic ~6 months ago and recently increasing dose to 2mg weekly prior to onset of symptoms. Reports she quit smoking 1 year ago. Denies any recent heavy drinking, stopped drinking in the 90s.     Lfts 177/133, 132/129    Interval hx  Patient MRCP without biliary obstruction, notable for pancreatic tail cyst    Medical History:  has a past medical history of Hypercholesteremia, Hypertension, and Thyroid disease.    Surgical History:  has a past surgical history that includes Tubal ligation.      Objective Findings:    Vital Signs:  Temp:  [97.9 °F (36.6 °C)-98.5 °F (36.9 °C)]   Pulse:  [66-74]   Resp:  [18-20]   BP: (131-175)/(60-83)   SpO2:  [95 %-98 %]   Body mass index is 35.19 kg/m².      Physical Exam  Vitals and nursing note reviewed.   Constitutional:       Appearance: Normal appearance.   HENT:      Head: Normocephalic.   Pulmonary:      Effort: Pulmonary effort is normal.   Abdominal:      General: Bowel sounds are normal.      Palpations: Abdomen is soft.   Skin:     General: Skin is warm and dry.   Neurological:      Mental Status: She is alert and oriented to person, place, and time.   Psychiatric:         Mood and Affect: Mood normal.         Behavior: Behavior normal.         Thought Content: Thought content normal.         Judgment: Judgment normal.               Labs:  Lab Results   Component Value Date    WBC 5.69  03/26/2025    HGB 11.4 (L) 03/26/2025    HCT 34.3 (L) 03/26/2025     03/26/2025    ALT 82 (H) 03/26/2025    AST 57 (H) 03/26/2025     03/26/2025    K 3.9 03/26/2025     03/26/2025    CREATININE 0.8 03/26/2025    BUN 9 03/26/2025    CO2 27 03/26/2025           Imaging: 3/24/25 CT abdomen pelvis- No acute intra-abdominal abnormalities identified.  2. Cholelithiasis.  3. Hepatosplenomegaly with possible hepatic steatosis.        Gallstone Pancreatitis. Elevated lipase. Epigastric pain. Cholelithiasis. Elevated lfts.  Plan/ Recommendations:  1.  Patient reports intermitted abdominal pain and nausea prior to ozempic use, began ozempic ~6 months ago and recently increased the dose to 2mg ~1 week ago prior to symptom exacerbation. Patient visited pcp and diagnosed with pancreatitis, however pain began to increase. Today she reports pain as improved, denies vomiting. US unremarkable for choledocholithiasis with normal biliary ducts and bilirubin normal. CT and US notes gallstone. Lfts improved, with suspicion of gallstone pancreatitis vs cholelithiasis and ozempic caused pancreatitis. Acute hep panel negative. Gen surg following. Rec abstain from ozempic and f/u with pcp to determine decreased dosing vs d/c. Rec supportive care with iv fluid @ 125, pain control, nausea control and ok for bland diet if pain controlled.   MRCP notable for suspect cyst on pancreatic tail, rec f/u in [facility]- I will send message.     Will sign off  Thank you so much for allowing us to participate in the care of Knena Robert . Please contact us if you have any additional questions.    Claire Sarabia, HALLEY  Gastroenterology  [facility] - Med Surg

## 2025-03-26 NOTE — PLAN OF CARE
Pt alert able to make needs known, tolerates medications well. IV antibiotics and fluids remain in progress, Repositioned self q 2hrs, pain controlled by PRN pain medication. Plan of care explained, diet tolerated, pt denies n,v,d, Remains free from falls and hospital acquired pressure injuries, safety maintained. Will continue following plan of care.      Problem: Adult Inpatient Plan of Care  Goal: Absence of Hospital-Acquired Illness or Injury  Outcome: Progressing     Problem: Wound  Goal: Optimal Coping  Outcome: Progressing     Problem: Wound  Goal: Optimal Functional Ability  Outcome: Progressing     Problem: Wound  Goal: Absence of Infection Signs and Symptoms  Outcome: Progressing

## 2025-03-26 NOTE — TRANSFER OF CARE
"Anesthesia Transfer of Care Note    Patient: Kenna Robert    Procedure(s) Performed: Procedure(s) (LRB):  ROBOTIC CHOLECYSTECTOMY (N/A)    Patient location: PACU    Anesthesia Type: general    Transport from OR: Transported from OR on 6-10 L/min O2 by face mask with adequate spontaneous ventilation    Post pain: adequate analgesia    Post assessment: no apparent anesthetic complications and tolerated procedure well    Post vital signs: stable    Level of consciousness: sedated and responds to stimulation    Nausea/Vomiting: no nausea/vomiting    Complications: none    Transfer of care protocol was followed    Last vitals: Visit Vitals  BP (!) 151/71 (BP Location: Right arm)   Pulse 89   Temp 36.4 °C (97.6 °F) (Temporal)   Resp 17   Ht 5' 4" (1.626 m)   Wt 93 kg (205 lb 0.4 oz)   SpO2 99%   Breastfeeding No   BMI 35.19 kg/m²     "

## 2025-03-26 NOTE — OP NOTE
[facility] - [place] - Surgery  Operative Note    SUMMARY     Surgery Date: 3/26/2025     Surgeons and Role:     * Sherman Maurice MD - Primary    Assisting Surgeon: None    Pre-op Diagnosis:  Calculus of gallbladder without cholecystitis without obstruction [K80.20]    Post-op Diagnosis:  Post-Op Diagnosis Codes:     * Calculus of gallbladder without cholecystitis without obstruction [K80.20]    Procedure: Robotic Cholecystectomy    Anesthesia: General    Indication for procedure: Kenna Robert is 59 y.o. female with symptomatic gallstones possible cholecystitis. After discussion of disease process, we discussed options and have elected for operation to perform a robotic vs lap vs open cholecystectomy.    Description of Procedure: After consent was obtained, patient was taken to the OR. The abdomen was prepped and draped in a standard sterile fashion after general anesthesia was started. Time out was performed. Antibiotics were started with ancef. We began the procedure by injecting marcaine + exparel and using a veress needle to access the abdominal cavity at Rosales's point.  Pneumoperitoneum was tolerated well and then we inserted a robotic trocar at this site under visualization with the 5 mm camera.  We then inserted additional robotic trocars (4 total) after injecting additional local anesthetic and placed the patient in right side up, and reverse trendelenberg. We docked the robot. We proceeded to dissect out the cystic duct and cystic artery. We clipped these structures and transected them after verifying our critical view of safety. We then removed the gallbladder from the fossa within the liver. We had no bile spillage and no significant bleeding. No suction was used. We removed the gallbladder with an endocatch bag. ICG/firefly was used during this process for verification of the anatomy.    The trocars and insufflation/CO2 was removed. We closed the fascia at the extraction site with 0 ethibond and closed the skin  with 4-0 monocryl and dermabond.  All counts were correct x2. Patient was awakened from anesthesia and taken to the recovery room in a stable condition having suffered no issues at this time.    Description of the findings of the procedure: critical view of safety.  Large gallstones  Icg used  No bile spillage. hemostatic    Estimated Blood Loss: 15 mL         Specimens:   Specimen (24h ago, onward)       Start     Ordered    03/26/25 1503  Specimen to Pathology  RELEASE UPON ORDERING        References:    Click here for ordering Quick Tip   Question:  Release to patient  Answer:  Immediate    03/26/25 1503                  Drains/Implants: None

## 2025-03-26 NOTE — ANESTHESIA PREPROCEDURE EVALUATION
03/26/2025  Kenna Robert is a 59 y.o., female.  Past Medical History:   Diagnosis Date    Hypercholesteremia     Hypertension     Thyroid disease          Pre-op Assessment          Review of Systems  Social:  Smoker       Cardiovascular:     Hypertension                                    Hypertension         Neurological:      Headaches      Dx of Headaches                           Endocrine:   Hypothyroidism       Hypothyroidism              Physical Exam  General: Well nourished    Airway:  Mallampati: I   Mouth Opening: Normal  Tongue: Normal  Neck ROM: Normal ROM    Dental:  Intact    Chest/Lungs:  Clear to auscultation    Heart:  Rate: Normal  Rhythm: Regular Rhythm  Sounds: Normal      Lab Results   Component Value Date    WBC 5.69 03/26/2025    HGB 11.4 (L) 03/26/2025    HCT 34.3 (L) 03/26/2025    MCV 93 03/26/2025     03/26/2025         Chemistry        Component Value Date/Time     03/26/2025 0437     03/09/2025 2201    K 3.9 03/26/2025 0437    K 4.1 03/09/2025 2201     03/26/2025 0437     03/09/2025 2201    CO2 27 03/26/2025 0437    CO2 23 03/09/2025 2201    BUN 9 03/26/2025 0437    CREATININE 0.8 03/26/2025 0437     (H) 03/09/2025 2201        Component Value Date/Time    CALCIUM 9.2 03/26/2025 0437    CALCIUM 10.2 03/09/2025 2201    ALKPHOS 87 03/26/2025 0437    ALKPHOS 75 03/09/2025 2201    AST 57 (H) 03/26/2025 0437    AST 30 03/09/2025 2201    ALT 82 (H) 03/26/2025 0437    ALT 27 03/09/2025 2201    BILITOT 0.2 03/26/2025 0437    BILITOT 0.3 03/09/2025 2201    ESTGFRAFRICA >60 12/22/2012 0030    EGFRNONAA >60 12/22/2012 0030            Anesthesia Plan  Type of Anesthesia, risks & benefits discussed:    Anesthesia Type: Gen ETT  Intra-op Monitoring Plan: Standard ASA Monitors  Induction:  IV  Informed Consent: Informed consent signed with the Patient and  all parties understand the risks and agree with anesthesia plan.  All questions answered. Patient consented to blood products? Yes  ASA Score: 2    Ready For Surgery From Anesthesia Perspective.     .

## 2025-03-26 NOTE — ANESTHESIA PROCEDURE NOTES
Intubation    Date/Time: 3/26/2025 2:11 PM    Performed by: Brittney Gambino CRNA  Authorized by: Vera Loya MD    Intubation:     Induction:  Intravenous    Intubated:  Postinduction    Mask Ventilation:  Easy mask    Attempts:  1    Attempted By:  CRNA    Method of Intubation:  Video laryngoscopy    Blade:  Beyer 3    Laryngeal View Grade: Grade I - full view of cords      Difficult Airway Encountered?: No      Complications:  None    Airway Device:  Oral endotracheal tube    Airway Device Size:  7.0    Style/Cuff Inflation:  Cuffed (inflated to minimal occlusive pressure)    Tube secured:  21    Secured at:  The lips    Placement Verified By:  Capnometry    Complicating Factors:  Retrognathia, small mouth, short neck and poor neck/head extension    Findings Post-Intubation:  BS equal bilateral and atraumatic/condition of teeth unchanged

## 2025-03-26 NOTE — ASSESSMENT & PLAN NOTE
Elevated lipase  Epigastric pain    59F w BMI 35 p/w several weeks of upper abdo pain.  Labs w elevated lipase and liver enzymes.  Gallstones on US.  Pancrease appears normal on CT.  Favor gallstone-driven pathology over primary pancreatitis.    Place in Obs  GI consulted. Suspects gallstone pancreatitis vs. Cholelithiasis vs. Ozempic induced pancreatitis.  MRCP Cholelithiasis with no findings to suggest acute cholecystitis.  No evidence of intra or extrahepatic biliary ductal dilatation, choledocholithiasis, stricture, or mass.   Pain control  IV fluids @ 125 mL/hr  General surgery consulted. Plan for lap parish today.

## 2025-03-26 NOTE — SUBJECTIVE & OBJECTIVE
Interval History: VSS, AF. Continuing to report periumbilical pain and new onset R flank pain. Plan for lap parish today vs. tomorrow.       Review of Systems   Constitutional:  Negative for activity change, appetite change, chills, fatigue and fever.   Respiratory:  Negative for cough and shortness of breath.    Cardiovascular:  Negative for chest pain, palpitations and leg swelling.   Gastrointestinal:  Positive for abdominal pain and nausea. Negative for constipation and vomiting.   Neurological:  Negative for dizziness, syncope, weakness and light-headedness.     Objective:     Vital Signs (Most Recent):  Temp: 98 °F (36.7 °C) (03/26/25 0801)  Pulse: 72 (03/26/25 0801)  Resp: 18 (03/26/25 0828)  BP: (!) 160/82 (03/26/25 0801)  SpO2: 98 % (03/26/25 0828) Vital Signs (24h Range):  Temp:  [97.9 °F (36.6 °C)-98.5 °F (36.9 °C)] 98 °F (36.7 °C)  Pulse:  [66-78] 72  Resp:  [18-20] 18  SpO2:  [95 %-99 %] 98 %  BP: (131-175)/(60-83) 160/82     Weight: 93 kg (205 lb 0.4 oz)  Body mass index is 35.19 kg/m².  No intake or output data in the 24 hours ending 03/26/25 1149        Physical Exam  Vitals and nursing note reviewed.   Constitutional:       Appearance: She is obese.   HENT:      Head: Normocephalic.      Mouth/Throat:      Pharynx: Oropharynx is clear.   Eyes:      Conjunctiva/sclera: Conjunctivae normal.   Cardiovascular:      Rate and Rhythm: Normal rate and regular rhythm.      Pulses: Normal pulses.   Pulmonary:      Effort: Pulmonary effort is normal.   Abdominal:      General: There is no distension.      Palpations: Abdomen is soft.      Tenderness: There is abdominal tenderness in the periumbilical area. There is no right CVA tenderness, left CVA tenderness or guarding.   Skin:     General: Skin is warm and dry.   Neurological:      General: No focal deficit present.      Mental Status: She is alert. Mental status is at baseline.   Psychiatric:         Mood and Affect: Mood normal.         Behavior: Behavior  normal.               Significant Labs: All pertinent labs within the past 24 hours have been reviewed.    Significant Imaging: I have reviewed all pertinent imaging results/findings within the past 24 hours.

## 2025-03-27 LAB
ABSOLUTE EOSINOPHIL (OHS): 0.01 K/UL
ABSOLUTE MONOCYTE (OHS): 0.32 K/UL (ref 0.3–1)
ABSOLUTE NEUTROPHIL COUNT (OHS): 4.01 K/UL (ref 1.8–7.7)
ALBUMIN SERPL BCP-MCNC: 3.3 G/DL (ref 3.5–5.2)
ALP SERPL-CCNC: 76 UNIT/L (ref 40–150)
ALT SERPL W/O P-5'-P-CCNC: 68 UNIT/L (ref 10–44)
ANION GAP (OHS): 11 MMOL/L (ref 8–16)
AST SERPL-CCNC: 46 UNIT/L (ref 11–45)
BASOPHILS # BLD AUTO: 0.03 K/UL
BASOPHILS NFR BLD AUTO: 0.5 %
BILIRUB SERPL-MCNC: 0.2 MG/DL (ref 0.1–1)
BUN SERPL-MCNC: 9 MG/DL (ref 6–20)
CALCIUM SERPL-MCNC: 9.7 MG/DL (ref 8.7–10.5)
CHLORIDE SERPL-SCNC: 105 MMOL/L (ref 95–110)
CO2 SERPL-SCNC: 25 MMOL/L (ref 23–29)
CREAT SERPL-MCNC: 1 MG/DL (ref 0.5–1.4)
ERYTHROCYTE [DISTWIDTH] IN BLOOD BY AUTOMATED COUNT: 13 % (ref 11.5–14.5)
GFR SERPLBLD CREATININE-BSD FMLA CKD-EPI: >60 ML/MIN/1.73/M2
GLUCOSE SERPL-MCNC: 136 MG/DL (ref 70–110)
HCT VFR BLD AUTO: 32.6 % (ref 37–48.5)
HGB BLD-MCNC: 10.7 GM/DL (ref 12–16)
IMM GRANULOCYTES # BLD AUTO: 0.03 K/UL (ref 0–0.04)
IMM GRANULOCYTES NFR BLD AUTO: 0.5 % (ref 0–0.5)
LYMPHOCYTES # BLD AUTO: 1.97 K/UL (ref 1–4.8)
MAGNESIUM SERPL-MCNC: 1.6 MG/DL (ref 1.6–2.6)
MCH RBC QN AUTO: 30.1 PG (ref 27–50)
MCHC RBC AUTO-ENTMCNC: 32.8 G/DL (ref 32–36)
MCV RBC AUTO: 92 FL (ref 82–98)
NUCLEATED RBC (/100WBC) (OHS): 0 /100 WBC
PHOSPHATE SERPL-MCNC: 2.6 MG/DL (ref 2.7–4.5)
PLATELET # BLD AUTO: 232 K/UL (ref 150–450)
PMV BLD AUTO: 10.2 FL (ref 9.2–12.9)
POTASSIUM SERPL-SCNC: 3.9 MMOL/L (ref 3.5–5.1)
PROT SERPL-MCNC: 6.7 GM/DL (ref 6–8.4)
RBC # BLD AUTO: 3.55 M/UL (ref 4–5.4)
RELATIVE EOSINOPHIL (OHS): 0.2 %
RELATIVE LYMPHOCYTE (OHS): 30.9 % (ref 18–48)
RELATIVE MONOCYTE (OHS): 5 % (ref 4–15)
RELATIVE NEUTROPHIL (OHS): 62.9 % (ref 38–73)
SODIUM SERPL-SCNC: 141 MMOL/L (ref 136–145)
WBC # BLD AUTO: 6.37 K/UL (ref 3.9–12.7)

## 2025-03-27 PROCEDURE — 63600175 PHARM REV CODE 636 W HCPCS: Mod: JZ,TB

## 2025-03-27 PROCEDURE — 25000003 PHARM REV CODE 250

## 2025-03-27 PROCEDURE — 25000003 PHARM REV CODE 250: Performed by: SURGERY

## 2025-03-27 PROCEDURE — 11000001 HC ACUTE MED/SURG PRIVATE ROOM

## 2025-03-27 PROCEDURE — 99233 SBSQ HOSP IP/OBS HIGH 50: CPT | Mod: ,,, | Performed by: STUDENT IN AN ORGANIZED HEALTH CARE EDUCATION/TRAINING PROGRAM

## 2025-03-27 PROCEDURE — 84100 ASSAY OF PHOSPHORUS: CPT | Performed by: SURGERY

## 2025-03-27 PROCEDURE — 94761 N-INVAS EAR/PLS OXIMETRY MLT: CPT

## 2025-03-27 PROCEDURE — 83735 ASSAY OF MAGNESIUM: CPT | Performed by: SURGERY

## 2025-03-27 PROCEDURE — 99900035 HC TECH TIME PER 15 MIN (STAT)

## 2025-03-27 PROCEDURE — 85025 COMPLETE CBC W/AUTO DIFF WBC: CPT | Performed by: SURGERY

## 2025-03-27 PROCEDURE — 80053 COMPREHEN METABOLIC PANEL: CPT | Performed by: SURGERY

## 2025-03-27 PROCEDURE — G0378 HOSPITAL OBSERVATION PER HR: HCPCS

## 2025-03-27 PROCEDURE — 63600175 PHARM REV CODE 636 W HCPCS: Performed by: SURGERY

## 2025-03-27 PROCEDURE — 36415 COLL VENOUS BLD VENIPUNCTURE: CPT | Performed by: SURGERY

## 2025-03-27 RX ORDER — CYCLOBENZAPRINE HCL 5 MG
10 TABLET ORAL 3 TIMES DAILY
Status: DISCONTINUED | OUTPATIENT
Start: 2025-03-27 | End: 2025-03-28 | Stop reason: HOSPADM

## 2025-03-27 RX ORDER — IBUPROFEN 600 MG/1
600 TABLET ORAL EVERY 6 HOURS
Status: DISCONTINUED | OUTPATIENT
Start: 2025-03-27 | End: 2025-03-27

## 2025-03-27 RX ORDER — BUTALBITAL, ACETAMINOPHEN AND CAFFEINE 50; 325; 40 MG/1; MG/1; MG/1
1 TABLET ORAL EVERY 4 HOURS PRN
Status: DISCONTINUED | OUTPATIENT
Start: 2025-03-27 | End: 2025-03-28 | Stop reason: HOSPADM

## 2025-03-27 RX ORDER — CYCLOBENZAPRINE HCL 5 MG
10 TABLET ORAL 3 TIMES DAILY PRN
Status: DISCONTINUED | OUTPATIENT
Start: 2025-03-27 | End: 2025-03-27

## 2025-03-27 RX ORDER — SIMETHICONE 80 MG
1 TABLET,CHEWABLE ORAL 3 TIMES DAILY PRN
Status: DISCONTINUED | OUTPATIENT
Start: 2025-03-27 | End: 2025-03-28 | Stop reason: HOSPADM

## 2025-03-27 RX ORDER — ACETAMINOPHEN 500 MG
1000 TABLET ORAL EVERY 6 HOURS
Status: DISCONTINUED | OUTPATIENT
Start: 2025-03-27 | End: 2025-03-28 | Stop reason: HOSPADM

## 2025-03-27 RX ORDER — HYDROMORPHONE HYDROCHLORIDE 1 MG/ML
1 INJECTION, SOLUTION INTRAMUSCULAR; INTRAVENOUS; SUBCUTANEOUS EVERY 4 HOURS PRN
Status: DISCONTINUED | OUTPATIENT
Start: 2025-03-27 | End: 2025-03-27

## 2025-03-27 RX ORDER — HYDROMORPHONE HYDROCHLORIDE 1 MG/ML
0.5 INJECTION, SOLUTION INTRAMUSCULAR; INTRAVENOUS; SUBCUTANEOUS EVERY 4 HOURS PRN
Status: DISCONTINUED | OUTPATIENT
Start: 2025-03-27 | End: 2025-03-27

## 2025-03-27 RX ORDER — OXYCODONE AND ACETAMINOPHEN 5; 325 MG/1; MG/1
1 TABLET ORAL EVERY 6 HOURS PRN
Refills: 0 | Status: DISCONTINUED | OUTPATIENT
Start: 2025-03-27 | End: 2025-03-28 | Stop reason: HOSPADM

## 2025-03-27 RX ORDER — GABAPENTIN 300 MG/1
300 CAPSULE ORAL 3 TIMES DAILY
Status: DISCONTINUED | OUTPATIENT
Start: 2025-03-27 | End: 2025-03-28 | Stop reason: HOSPADM

## 2025-03-27 RX ORDER — GEMFIBROZIL 600 MG/1
600 TABLET, FILM COATED ORAL
Status: DISCONTINUED | OUTPATIENT
Start: 2025-03-27 | End: 2025-03-28 | Stop reason: HOSPADM

## 2025-03-27 RX ORDER — IBUPROFEN 600 MG/1
600 TABLET ORAL EVERY 6 HOURS
Status: DISCONTINUED | OUTPATIENT
Start: 2025-03-27 | End: 2025-03-28 | Stop reason: HOSPADM

## 2025-03-27 RX ADMIN — CITALOPRAM HYDROBROMIDE 20 MG: 20 TABLET ORAL at 10:03

## 2025-03-27 RX ADMIN — ACETAMINOPHEN 1000 MG: 500 TABLET ORAL at 12:03

## 2025-03-27 RX ADMIN — POLYETHYLENE GLYCOL 3350 17 G: 17 POWDER, FOR SOLUTION ORAL at 10:03

## 2025-03-27 RX ADMIN — OXYCODONE HYDROCHLORIDE AND ACETAMINOPHEN 1 TABLET: 5; 325 TABLET ORAL at 04:03

## 2025-03-27 RX ADMIN — SIMETHICONE 80 MG: 80 TABLET, CHEWABLE ORAL at 09:03

## 2025-03-27 RX ADMIN — GEMFIBROZIL 600 MG: 600 TABLET ORAL at 04:03

## 2025-03-27 RX ADMIN — PANTOPRAZOLE SODIUM 40 MG: 40 TABLET, DELAYED RELEASE ORAL at 10:03

## 2025-03-27 RX ADMIN — FUROSEMIDE 20 MG: 20 TABLET ORAL at 10:03

## 2025-03-27 RX ADMIN — HYDROMORPHONE HYDROCHLORIDE 1 MG: 1 INJECTION, SOLUTION INTRAMUSCULAR; INTRAVENOUS; SUBCUTANEOUS at 09:03

## 2025-03-27 RX ADMIN — ACETAMINOPHEN 1000 MG: 500 TABLET ORAL at 04:03

## 2025-03-27 RX ADMIN — LEVOTHYROXINE SODIUM 100 MCG: 0.1 TABLET ORAL at 06:03

## 2025-03-27 RX ADMIN — GABAPENTIN 300 MG: 300 CAPSULE ORAL at 09:03

## 2025-03-27 RX ADMIN — MORPHINE SULFATE 2 MG: 4 INJECTION, SOLUTION INTRAMUSCULAR; INTRAVENOUS at 01:03

## 2025-03-27 RX ADMIN — CYCLOBENZAPRINE HYDROCHLORIDE 10 MG: 5 TABLET, FILM COATED ORAL at 09:03

## 2025-03-27 RX ADMIN — GABAPENTIN 300 MG: 300 CAPSULE ORAL at 02:03

## 2025-03-27 RX ADMIN — IBUPROFEN 600 MG: 600 TABLET, FILM COATED ORAL at 04:03

## 2025-03-27 RX ADMIN — LOSARTAN POTASSIUM 25 MG: 25 TABLET, FILM COATED ORAL at 10:03

## 2025-03-27 RX ADMIN — MORPHINE SULFATE 4 MG: 4 INJECTION INTRAVENOUS at 06:03

## 2025-03-27 RX ADMIN — FUROSEMIDE 20 MG: 20 TABLET ORAL at 04:03

## 2025-03-27 NOTE — PLAN OF CARE
Problem: Pain Acute  Goal: Optimal Pain Control and Function  Outcome: Progressing  Intervention: Develop Pain Management Plan  Flowsheets (Taken 3/27/2025 1810)  Pain Management Interventions: medication offered

## 2025-03-27 NOTE — ASSESSMENT & PLAN NOTE
Patient's blood pressure range in the last 24 hours was: BP  Min: 123/57  Max: 173/80.The patient's inpatient anti-hypertensive regimen is listed below:  Current Antihypertensives  hydrALAZINE injection 10 mg, Every 6 hours PRN, Intravenous  furosemide tablet 20 mg, 2 times daily, Oral  losartan tablet 25 mg, Daily, Oral    Plan  - BP is controlled, no changes needed to their regimen

## 2025-03-27 NOTE — ASSESSMENT & PLAN NOTE
- Initially presented with transaminitis. Gemfibrozil held. Transaminitis now downtrending.   - Resume Gemfibrozil

## 2025-03-27 NOTE — PROGRESS NOTES
Surgery Progress Note    Kenna Robert is a 59 y.o. year old female in hospital for gall stone pancreatitis now s/p robotic cholecystectomy on 3/26/2025, POD#1      Interval history:  No acute events overnight  Afebrile, vitally stable.  This morning, the patient is complaining of significant right upper quadrant abdominal pain. No nausea or vomiting.   Tolerating clears.   Passing gas. No bowel movements.   Ambulating.     ROS:  Negative except above    PE:  Vitals:    03/27/25 0624 03/27/25 0807 03/27/25 0927 03/27/25 1030   BP:  (!) 173/80     BP Location:       Patient Position:  Lying     Pulse:  82  81   Resp: 18 18 20 18   Temp:  97.8 °F (36.6 °C)     TempSrc:  Oral     SpO2:  95%  95%   Weight:       Height:           NAD  No belabored breathing  No skin changes  Abd soft, mild tenderness at the right upper quadrant, non distended. Incisions c/d/I    Significant Diagnostic Studies: N/A    A/P:  Kenna Robert is a 59 y.o. year old female in hospital for gall stone pancreatitis now s/p robotic cholecystectomy on 3/26/2025, POD#1. Still complaining of right upper quadrant abdominal pain. Tolerating clears with evidence of return of bowel functions.     - Adjust multimodal pain regimen.   - Advance to regular diet.  - Ok for DVT ppx    [facility]  General Surgery - [facility]  3/27/2025

## 2025-03-27 NOTE — PROGRESS NOTES
[facility]  Progress Note    Patient Name: Kenna Robert  MRN: [id number]  Patient Class: OP- Observation   Admission Date: 3/24/2025  Length of Stay: 0 days  Attending Physician: Moe Clayton MD  Primary Care Provider: Gio Elizabeth MD        Subjective     Principal Problem:Calculus of gallbladder        HPI:  Kenna Robert is a 59 y.o. female with  BMI 35, MASH, Non-Insulin-dependent DM2, HTN, and HLD who presented to [facility] ED on 03/24/2025 for eval and treatment of intractable abdo pain.  They describe their pain as cramping, 10/10, located in the epigastric area without radiation.  It started 2 weeks ago and has been worsening since, with symptom frequency now constant.  She's also concerned that she is having constant diarrhea, as she has previously been constipated while on Ozempic.  There is associated N/V.  They deny associated fever, chills, chest pain, dyspnea, urinary sxs.  Symptoms are alleviated by none of the OTC meds she's tried.  Symptoms are worsened by movement and eating.  She's been on Ozempic for the past 8 months (reports minimal benefits noticed), and was told by her PCP to pause it given her sxs and outpatient labs showing pancreatitis last week.    ED course: lipase 303. LFTs also elevated 200s AST/177 ALT.  Hypertensive on arrival but VS otherwise unremarkable.  Exam with general abdo tenderness worse in upper abdomen.  US: Cholelithiasis with small amount of gallbladder sludge.  No ultrasonographic evidence to suggest acute cholecystitis.  ED therapy measures: morphine, zofran and phenergan.  They were placed in observation under the care of [facility] for further evaluation and treatment.      Overview/Hospital Course:  No notes on file    Interval History: VSS, AF. Complaining of new onset severe pain to the RUQ this AM.      Review of Systems   Constitutional:  Negative for activity change, appetite change, chills, fatigue and  fever.   Respiratory:  Negative for cough and shortness of breath.    Cardiovascular:  Negative for chest pain, palpitations and leg swelling.   Gastrointestinal:  Positive for abdominal pain and nausea. Negative for constipation and vomiting.   Neurological:  Negative for dizziness, syncope, weakness and light-headedness.     Objective:     Vital Signs (Most Recent):  Temp: 97.8 °F (36.6 °C) (03/27/25 0807)  Pulse: 82 (03/27/25 0807)  Resp: 18 (03/27/25 0807)  BP: (!) 173/80 (03/27/25 0807)  SpO2: 95 % (03/27/25 0807) Vital Signs (24h Range):  Temp:  [97.6 °F (36.4 °C)-98.2 °F (36.8 °C)] 97.8 °F (36.6 °C)  Pulse:  [] 82  Resp:  [12-18] 18  SpO2:  [89 %-99 %] 95 %  BP: (123-173)/(57-90) 173/80     Weight: 93 kg (205 lb 0.4 oz)  Body mass index is 35.19 kg/m².    Intake/Output Summary (Last 24 hours) at 3/27/2025 0847  Last data filed at 3/26/2025 1811  Gross per 24 hour   Intake 1490 ml   Output 15 ml   Net 1475 ml           Physical Exam  Vitals and nursing note reviewed.   Constitutional:       Appearance: She is obese.   HENT:      Head: Normocephalic.      Mouth/Throat:      Pharynx: Oropharynx is clear.   Eyes:      Conjunctiva/sclera: Conjunctivae normal.   Cardiovascular:      Rate and Rhythm: Normal rate and regular rhythm.      Pulses: Normal pulses.   Pulmonary:      Effort: Pulmonary effort is normal.   Abdominal:      General: There is no distension.      Palpations: Abdomen is soft.      Tenderness: There is abdominal tenderness in the periumbilical area. There is no right CVA tenderness, left CVA tenderness or guarding.   Skin:     General: Skin is warm and dry.   Neurological:      General: No focal deficit present.      Mental Status: She is alert. Mental status is at baseline.   Psychiatric:         Mood and Affect: Mood normal.         Behavior: Behavior normal.               Significant Labs: All pertinent labs within the past 24 hours have been reviewed.    Significant Imaging: I have  reviewed all pertinent imaging results/findings within the past 24 hours.      Assessment & Plan  Calculus of gallbladder  Elevated lipase  Epigastric pain     59F w BMI 35 p/w several weeks of upper abdo pain.  Labs w elevated lipase and liver enzymes.  Gallstones on US.  Pancrease appears normal on CT.  Favor gallstone-driven pathology over primary pancreatitis.     Place in Obs  MRCP Cholelithiasis with no findings to suggest acute cholecystitis.  No evidence of intra or extrahepatic biliary ductal dilatation, choledocholithiasis, stricture, or mass.   Now s/p laparoscopic cholecystectomy on 03/27. Complaining of new onset severe pain to the RUQ.  Pain control  IV fluids       Hyperlipidemia  - Initially presented with transaminitis. Gemfibrozil held. Transaminitis now downtrending.   - Resume Gemfibrozil    HTN (hypertension)  Patient's blood pressure range in the last 24 hours was: BP  Min: 123/57  Max: 173/80.The patient's inpatient anti-hypertensive regimen is listed below:  Current Antihypertensives  hydrALAZINE injection 10 mg, Every 6 hours PRN, Intravenous  furosemide tablet 20 mg, 2 times daily, Oral  losartan tablet 25 mg, Daily, Oral    Plan  - BP is controlled, no changes needed to their regimen  Hypothyroidism  - Continue home Synthroid    VTE Risk Mitigation (From admission, onward)           Ordered     IP VTE HIGH RISK PATIENT  Once         03/24/25 2330     Place sequential compression device  Until discontinued         03/24/25 2330     Reason for No Pharmacological VTE Prophylaxis  Once        Question:  Reasons:  Answer:  Patient is Ambulatory    03/24/25 2330                    Discharge Planning   PEDRITO: 3/26/2025     Code Status: Full Code   Medical Readiness for Discharge Date: 3/26/2025  Discharge Plan A: Home with family (with instructions to follow up)                        Kathy Puri PA-C  Department of Hospital Medicine   [facility] [place] [facility]

## 2025-03-27 NOTE — ASSESSMENT & PLAN NOTE
Elevated lipase  Epigastric pain     59F w BMI 35 p/w several weeks of upper abdo pain.  Labs w elevated lipase and liver enzymes.  Gallstones on US.  Pancrease appears normal on CT.  Favor gallstone-driven pathology over primary pancreatitis.     Place in Obs  MRCP Cholelithiasis with no findings to suggest acute cholecystitis.  No evidence of intra or extrahepatic biliary ductal dilatation, choledocholithiasis, stricture, or mass.   Now s/p laparoscopic cholecystectomy on 03/27. Complaining of new onset severe pain to the RUQ.  Pain control  IV fluids

## 2025-03-27 NOTE — SUBJECTIVE & OBJECTIVE
Interval History: VSS, AF. Complaining of new onset severe pain to the RUQ this AM.      Review of Systems   Constitutional:  Negative for activity change, appetite change, chills, fatigue and fever.   Respiratory:  Negative for cough and shortness of breath.    Cardiovascular:  Negative for chest pain, palpitations and leg swelling.   Gastrointestinal:  Positive for abdominal pain and nausea. Negative for constipation and vomiting.   Neurological:  Negative for dizziness, syncope, weakness and light-headedness.     Objective:     Vital Signs (Most Recent):  Temp: 97.8 °F (36.6 °C) (03/27/25 0807)  Pulse: 82 (03/27/25 0807)  Resp: 18 (03/27/25 0807)  BP: (!) 173/80 (03/27/25 0807)  SpO2: 95 % (03/27/25 0807) Vital Signs (24h Range):  Temp:  [97.6 °F (36.4 °C)-98.2 °F (36.8 °C)] 97.8 °F (36.6 °C)  Pulse:  [] 82  Resp:  [12-18] 18  SpO2:  [89 %-99 %] 95 %  BP: (123-173)/(57-90) 173/80     Weight: 93 kg (205 lb 0.4 oz)  Body mass index is 35.19 kg/m².    Intake/Output Summary (Last 24 hours) at 3/27/2025 0847  Last data filed at 3/26/2025 1811  Gross per 24 hour   Intake 1490 ml   Output 15 ml   Net 1475 ml           Physical Exam  Vitals and nursing note reviewed.   Constitutional:       Appearance: She is obese.   HENT:      Head: Normocephalic.      Mouth/Throat:      Pharynx: Oropharynx is clear.   Eyes:      Conjunctiva/sclera: Conjunctivae normal.   Cardiovascular:      Rate and Rhythm: Normal rate and regular rhythm.      Pulses: Normal pulses.   Pulmonary:      Effort: Pulmonary effort is normal.   Abdominal:      General: There is no distension.      Palpations: Abdomen is soft.      Tenderness: There is abdominal tenderness in the periumbilical area. There is no right CVA tenderness, left CVA tenderness or guarding.   Skin:     General: Skin is warm and dry.   Neurological:      General: No focal deficit present.      Mental Status: She is alert. Mental status is at baseline.   Psychiatric:         Mood  and Affect: Mood normal.         Behavior: Behavior normal.               Significant Labs: All pertinent labs within the past 24 hours have been reviewed.    Significant Imaging: I have reviewed all pertinent imaging results/findings within the past 24 hours.

## 2025-03-28 VITALS
TEMPERATURE: 98 F | BODY MASS INDEX: 35 KG/M2 | SYSTOLIC BLOOD PRESSURE: 157 MMHG | RESPIRATION RATE: 18 BRPM | HEIGHT: 64 IN | OXYGEN SATURATION: 95 % | DIASTOLIC BLOOD PRESSURE: 77 MMHG | WEIGHT: 205 LBS | HEART RATE: 66 BPM

## 2025-03-28 PROBLEM — K80.20 CALCULUS OF GALLBLADDER: Status: RESOLVED | Noted: 2025-03-25 | Resolved: 2025-03-28

## 2025-03-28 PROCEDURE — 25000003 PHARM REV CODE 250: Performed by: SURGERY

## 2025-03-28 PROCEDURE — G0378 HOSPITAL OBSERVATION PER HR: HCPCS

## 2025-03-28 PROCEDURE — 25000003 PHARM REV CODE 250

## 2025-03-28 RX ORDER — OXYCODONE AND ACETAMINOPHEN 5; 325 MG/1; MG/1
1 TABLET ORAL EVERY 6 HOURS PRN
Qty: 12 TABLET | Refills: 0 | Status: SHIPPED | OUTPATIENT
Start: 2025-03-28

## 2025-03-28 RX ADMIN — FUROSEMIDE 20 MG: 20 TABLET ORAL at 09:03

## 2025-03-28 RX ADMIN — CYCLOBENZAPRINE HYDROCHLORIDE 10 MG: 5 TABLET, FILM COATED ORAL at 09:03

## 2025-03-28 RX ADMIN — ACETAMINOPHEN 1000 MG: 500 TABLET ORAL at 06:03

## 2025-03-28 RX ADMIN — POLYETHYLENE GLYCOL 3350 17 G: 17 POWDER, FOR SOLUTION ORAL at 09:03

## 2025-03-28 RX ADMIN — GEMFIBROZIL 600 MG: 600 TABLET ORAL at 06:03

## 2025-03-28 RX ADMIN — IBUPROFEN 600 MG: 600 TABLET, FILM COATED ORAL at 12:03

## 2025-03-28 RX ADMIN — PANTOPRAZOLE SODIUM 40 MG: 40 TABLET, DELAYED RELEASE ORAL at 09:03

## 2025-03-28 RX ADMIN — GABAPENTIN 300 MG: 300 CAPSULE ORAL at 09:03

## 2025-03-28 RX ADMIN — LOSARTAN POTASSIUM 25 MG: 25 TABLET, FILM COATED ORAL at 09:03

## 2025-03-28 RX ADMIN — LEVOTHYROXINE SODIUM 100 MCG: 0.1 TABLET ORAL at 06:03

## 2025-03-28 RX ADMIN — ACETAMINOPHEN 1000 MG: 500 TABLET ORAL at 12:03

## 2025-03-28 RX ADMIN — IBUPROFEN 600 MG: 600 TABLET, FILM COATED ORAL at 06:03

## 2025-03-28 RX ADMIN — CITALOPRAM HYDROBROMIDE 20 MG: 20 TABLET ORAL at 09:03

## 2025-03-28 NOTE — DISCHARGE SUMMARY
[facility]  Discharge Note  Short Stay    Procedure(s) (LRB):  ROBOTIC CHOLECYSTECTOMY (N/A)      OUTCOME: Patient tolerated treatment/procedure well without complication and is now ready for discharge.    DISPOSITION: Home or Self Care    FINAL DIAGNOSIS:  Calculus of gallbladder    FOLLOWUP: In clinic    DISCHARGE INSTRUCTIONS:    Discharge Procedure Orders   Notify your health care provider if you experience any of the following:  temperature >100.4     Notify your health care provider if you experience any of the following:  persistent nausea and vomiting or diarrhea     Notify your health care provider if you experience any of the following:  severe uncontrolled pain     Notify your health care provider if you experience any of the following:  increased confusion or weakness     Notify your health care provider if you experience any of the following:  persistent dizziness, light-headedness, or visual disturbances     Notify your health care provider if you experience any of the following:  severe persistent headache     Notify your health care provider if you experience any of the following:  difficulty breathing or increased cough     Notify your health care provider if you experience any of the following:  redness, tenderness, or signs of infection (pain, swelling, redness, odor or green/yellow discharge around incision site)     Notify your health care provider if you experience any of the following:  temperature >100.4     Notify your health care provider if you experience any of the following:  persistent nausea and vomiting or diarrhea     Notify your health care provider if you experience any of the following:  severe uncontrolled pain     Notify your health care provider if you experience any of the following:  redness, tenderness, or signs of infection (pain, swelling, redness, odor or green/yellow discharge around incision site)     Notify your health care provider if you experience any of  the following:  difficulty breathing or increased cough     Notify your health care provider if you experience any of the following:  severe persistent headache     Notify your health care provider if you experience any of the following:  persistent dizziness, light-headedness, or visual disturbances     Notify your health care provider if you experience any of the following:  increased confusion or weakness     Activity as tolerated         Clinical Reference Documents Added to Patient Instructions         Document    CHOLECYSTECTOMY, LAPAROSCOPIC SURGERY (ENGLISH)            TIME SPENT ON DISCHARGE: 20 minutes

## 2025-03-28 NOTE — NURSING
Abdominal CHG prep done   
Discharge is complete. IV removed, catheter tip intact. Pt is waiting for her ride to transport her home and her meds fromt he [facility]  
NPO now  
NPO starting now for MRI MRCP  
[facility] [place]  Nurses Note -- 4 Eyes      3/27/2025       Skin assessed on: Q Shift      [x] No Pressure Injuries Present    []Prevention Measures Documented    [] Yes LDA  for Pressure Injury Previously documented     [] Yes New Pressure Injury Discovered   [] LDA for New Pressure Injury Added      Attending RN:  Lizeth Presley RN     Second RN:  LOUIE Garcia        
[facility]  Nurses Note -- 4 Eyes      3/27/2025       Skin assessed on: Q Shift      [x] No Pressure Injuries Present    []Prevention Measures Documented    [] Yes LDA  for Pressure Injury Previously documented     [] Yes New Pressure Injury Discovered   [] LDA for New Pressure Injury Added      Attending RN:  Xin Robles RN     Second RN:  LINDA Weiss      
[facility]  Nurses Note -- 4 Eyes      3/25/2025       Skin assessed on: Q Shift      [x] No Pressure Injuries Present    [x]Prevention Measures Documented    [] Yes LDA  for Pressure Injury Previously documented     [] Yes New Pressure Injury Discovered   [] LDA for New Pressure Injury Added      Attending RN:  Vasile Ochoa RN     Second RN:  Mira Boo RN      
[facility]  Nurses Note -- 4 Eyes      3/28/2025       Skin assessed on: Q Shift      [x] No Pressure Injuries Present    [x]Prevention Measures Documented    [] Yes LDA  for Pressure Injury Previously documented     [] Yes New Pressure Injury Discovered   [] LDA for New Pressure Injury Added      Attending RN:  Johana Stovall, RN     Second RN:  Xin Robles       
[facility] [place]  Nurses Note -- 4 Eyes      3/26/2025       Skin assessed on: Q Shift      [x] No Pressure Injuries Present    [x]Prevention Measures Documented    [] Yes LDA  for Pressure Injury Previously documented     [] Yes New Pressure Injury Discovered   [] LDA for New Pressure Injury Added      Attending RN:  Vasile Ochoa RN     Second RN:  Mira Boo RN      
Pt arrived on unit, awake alert and oriented. IV site noted IVF infusing, stopped. Pt on room air, no distress noted. Vitals assessed. No complaints of pain/discomfort. Safety measures maintained. Will cont to monitor.  
Pt returned back to floor from MRI. IVF restarted. Dinner tray given to pt. Pt complaints of headache, prn medication available @2300.  at bedside. Safety measures maintained. Will cont to monitor  
Pt sitting up in chair putting comfort clothing on. NADN. Pt A&O x4. Restarted continuous LR @ 125 mL/hr. Pt tolerating well. Pt denies any pain or discomfort at this time. Bed in lowest position, personal items and call light within reach, instructed to call for help if needed.    [facility] [place]  Nurses Note -- 4 Eyes        March 26, 2025        Skin assessed on: Q Shift        [x] No Pressure Injuries Present                 [x]Prevention Measures Documented     [] Yes LDA  for Pressure Injury Previously documented      [] Yes New Pressure Injury Discovered              [] LDA for New Pressure Injury Added        Attending RN:  Jose Miramontes LPN      Second RN:  Vasile RN          
Pt transported off floor to MRI via wheel chair. All jewelry removed. IV saline locked. Safety measures maintained. Will cont to monitor  
Received handoff from LINDA Burnette. Pt sitting up in bed talking with family at bedside. No complaints. Cont IVF. Pt is NPO, awaiting MRI. Safety measures maintained. Will cont to monitor  
Report received from the off - going nurse. Pt noted with RR even and regular  with O2 at  room air. Skin warm and dry . Pt noted alert and oriented x 4.  Abdomen large and round and soft . Pt moves all  extremities fairly well with the lap norma sites x3 mid upper abdomen  clean, dry with derma bond intact. No c/o of pain or discomfort . Urinary elimination via BRP . Call light within reach and pt is instructed how to use the call light.  Bed in the lowest position with SR's elevated x 2.  Pt encouraged to call for staff before getting up OOB.  Bed alarm on.  Saline lock intact with no evidence of redness or infiltration noted in the 22 gauge lt FA position. Will continue to monitor.  
Wheelchair requested  
taken to OR via stretcher  
A Family Medicine Doctor  Family Medicine  .  [place]   Phone:   Fax:   Follow Up Time: 4-6 Days

## 2025-03-28 NOTE — PLAN OF CARE
Problem: Adult Inpatient Plan of Care  Goal: Plan of Care Review  Outcome: Met  Goal: Patient-Specific Goal (Individualized)  Outcome: Met  Goal: Absence of Hospital-Acquired Illness or Injury  Outcome: Met  Goal: Optimal Comfort and Wellbeing  Outcome: Met  Goal: Readiness for Transition of Care  Outcome: Met     Problem: Infection  Goal: Absence of Infection Signs and Symptoms  Outcome: Met     Problem: Wound  Goal: Optimal Coping  Outcome: Met  Goal: Optimal Functional Ability  Outcome: Met  Goal: Absence of Infection Signs and Symptoms  Outcome: Met  Goal: Improved Oral Intake  Outcome: Met  Goal: Optimal Pain Control and Function  Outcome: Met  Goal: Skin Health and Integrity  Outcome: Met  Goal: Optimal Wound Healing  Outcome: Met     Problem: Pain Acute  Goal: Optimal Pain Control and Function  Outcome: Met

## 2025-03-28 NOTE — PROGRESS NOTES
Surgery Progress Note    Kenna Robert is a 59 y.o. year old female in hospital for gall stone pancreatitis now s/p robotic cholecystectomy on 3/26/2025, POD#2      Interval history:  No acute events overnight  Afebrile, vitally stable.  Patient reported improvement of her abdominal pain. Now only 1/10. No nausea or vomiting.   Tolerating clears.   Passing gas. No bowel movements.   Ambulating.         ROS:  Negative except above    PE:  Vitals:    03/28/25 0032 03/28/25 0449 03/28/25 0626 03/28/25 0809   BP:  (!) 164/74  (!) 157/77   BP Location:  Right arm  Right arm   Patient Position:  Lying  Sitting   Pulse:  61  66   Resp:  20  18   Temp: 97 °F (36.1 °C) 97.5 °F (36.4 °C) 97.5 °F (36.4 °C) 97.9 °F (36.6 °C)   TempSrc:  Oral  Oral   SpO2:  96%  95%   Weight:       Height:           NAD  No belabored breathing  No skin changes  Abd soft, non tender, non distended. Incisions c/d/I.         Significant Diagnostic Studies: N/A    A/P:  Kenna Robert is a 59 y.o. year old female in hospital for gall stone pancreatitis now s/p robotic cholecystectomy on 3/26/2025, POD#2. Pain improved. No nausea or vomiting. Tolerating diet. Ambulating.      - Adjust multimodal pain regimen.   - Advance to regular diet.  - Ok for DVT ppx  - Discharge home.        [facility]  General Surgery - [facility]  3/28/2025

## 2025-03-28 NOTE — DISCHARGE SUMMARY
[facility] Medicine  Discharge Summary      Patient Name: Kenna Robert  MRN: [id number]  KORY: [id number]  Patient Class: IP- Inpatient  Admission Date: 3/24/2025  Hospital Length of Stay: 1 days  Discharge Date and Time:  03/28/2025 9:58 AM  Attending Physician: Moe Clayton MD   Discharging Provider: Kathy Puri PA-C  Primary Care Provider: Gio Elizabeth MD    Primary Care Team:  [facility]    HPI:   Kenna Robert is a 59 y.o. female with  BMI 35, MASH, Non-Insulin-dependent DM2, HTN, and HLD who presented to [facility] ED on 03/24/2025 for eval and treatment of intractable abdo pain.  They describe their pain as cramping, 10/10, located in the epigastric area without radiation.  It started 2 weeks ago and has been worsening since, with symptom frequency now constant.  She's also concerned that she is having constant diarrhea, as she has previously been constipated while on Ozempic.  There is associated N/V.  They deny associated fever, chills, chest pain, dyspnea, urinary sxs.  Symptoms are alleviated by none of the OTC meds she's tried.  Symptoms are worsened by movement and eating.  She's been on Ozempic for the past 8 months (reports minimal benefits noticed), and was told by her PCP to pause it given her sxs and outpatient labs showing pancreatitis last week.    ED course: lipase 303. LFTs also elevated 200s AST/177 ALT.  Hypertensive on arrival but VS otherwise unremarkable.  Exam with general abdo tenderness worse in upper abdomen.  US: Cholelithiasis with small amount of gallbladder sludge.  No ultrasonographic evidence to suggest acute cholecystitis.  ED therapy measures: morphine, zofran and phenergan.  They were placed in observation under the care of [facility] for further evaluation and treatment.      Procedure(s) (LRB):  ROBOTIC CHOLECYSTECTOMY (N/A)      Hospital Course:   59 y.o. female admitted for further evaluation of epigastric  abdominal pain with associated nausea. GI consulted. US unremarkable for choledocholithiasis with normal biliary ducts and bilirubin normal. CT and US noted gallstones. Lfts elevated, with suspicion of gallstone pancreatitis vs cholelithiasis and ozempic caused pancreatitis. General Surgery was consulted and MRCP obtained. Results showed cholelithiasis with no findings to suggest acute cholecystitis, extrahepatic biliary ductal dilatation, choledocholithiasis, stricture, or mass. Patient underwent laparoscopic cholecystectomy on 03/26. She began to complaining of severe pain to the RUQ s/p procedure. She was started on a multimodal pain regimen and given simethicone with notable improvement of pain. Prior to discharge, patient was without significant abdominal pain, n/v, fever or chills. She reported passing flatus. She had not yet had a bowel movement, however, reports frequent episodes of constipation, which resolves with her home bowel regimen. She was told to return to the ED in case of severe pain, n/v, fever, chills, or myalgias. She was instructed to hold Ozempic until follow-up appointment with PCP. Patient is hemodynamically stable for discharge.       Goals of Care Treatment Preferences:  Code Status: Full Code      SDOH Screening:  The patient was screened for utility difficulties, food insecurity, transport difficulties, housing insecurity, and interpersonal safety and there were no concerns identified this admission.     Consults:   Consults (From admission, onward)          Status Ordering Provider     Inpatient consult to General Surgery  Once        Provider:  (Not yet assigned)    Completed LATOSHA VILLALTA     Inpatient consult to Gastroenterology  Once        Provider:  Claire Sarabia NP    Completed CARMEN HUANG            Assessment & Plan  Hyperlipidemia  - Initially presented with transaminitis. Gemfibrozil held. Transaminitis now downtrending.   - Resume Gemfibrozil    HTN  (hypertension)  Patient's blood pressure range in the last 24 hours was: BP  Min: 130/64  Max: 164/74.The patient's inpatient anti-hypertensive regimen is listed below:  Current Antihypertensives  hydrALAZINE injection 10 mg, Every 6 hours PRN, Intravenous  furosemide tablet 20 mg, 2 times daily, Oral  losartan tablet 25 mg, Daily, Oral    Plan  - BP is controlled, no changes needed to their regimen  Hypothyroidism  - Continue home Synthroid    Final Active Diagnoses:    Diagnosis Date Noted POA    Hypothyroidism [E03.9] 03/24/2025 Yes    Hyperlipidemia [E78.5] 11/16/2024 Yes    HTN (hypertension) [I10] 11/16/2024 Yes      Problems Resolved During this Admission:    Diagnosis Date Noted Date Resolved POA    PRINCIPAL PROBLEM:  Calculus of gallbladder [K80.20] 03/25/2025 03/28/2025 Yes    Epigastric pain [R10.13] 03/25/2025 03/26/2025 Yes    Elevated lipase [R74.8] 03/25/2025 03/26/2025 Yes       Discharged Condition: good    Disposition: Home or Self Care    Follow Up:    Patient Instructions:      Notify your health care provider if you experience any of the following:  temperature >100.4     Notify your health care provider if you experience any of the following:  persistent nausea and vomiting or diarrhea     Notify your health care provider if you experience any of the following:  severe uncontrolled pain     Notify your health care provider if you experience any of the following:  increased confusion or weakness     Notify your health care provider if you experience any of the following:  persistent dizziness, light-headedness, or visual disturbances     Notify your health care provider if you experience any of the following:  severe persistent headache     Notify your health care provider if you experience any of the following:  difficulty breathing or increased cough     Notify your health care provider if you experience any of the following:  redness, tenderness, or signs of infection (pain, swelling,  redness, odor or green/yellow discharge around incision site)     Notify your health care provider if you experience any of the following:  temperature >100.4     Notify your health care provider if you experience any of the following:  persistent nausea and vomiting or diarrhea     Notify your health care provider if you experience any of the following:  severe uncontrolled pain     Notify your health care provider if you experience any of the following:  redness, tenderness, or signs of infection (pain, swelling, redness, odor or green/yellow discharge around incision site)     Notify your health care provider if you experience any of the following:  difficulty breathing or increased cough     Notify your health care provider if you experience any of the following:  severe persistent headache     Notify your health care provider if you experience any of the following:  persistent dizziness, light-headedness, or visual disturbances     Notify your health care provider if you experience any of the following:  increased confusion or weakness     Activity as tolerated       Significant Diagnostic Studies: N/A    Pending Diagnostic Studies:       Procedure Component Value Units Date/Time    Stool Exam-Ova,Cysts,Parasites [[id number]]     Order Status: Sent Lab Status: No result     Specimen: Stool     WBC, Stool [[id number]]     Order Status: Sent Lab Status: No result     Specimen: Stool            Medications:  Reconciled Home Medications:      Medication List        START taking these medications      oxyCODONE-acetaminophen 5-325 mg per tablet  Commonly known as: PERCOCET  Take 1 tablet by mouth every 6 (six) hours as needed for Pain.            CONTINUE taking these medications      albuterol 90 mcg/actuation inhaler  Commonly known as: PROVENTIL/VENTOLIN HFA  Inhale 1-2 puffs into the lungs every 6 (six) hours as needed for Wheezing or Shortness of Breath. Rescue     butalbital-acetaminophen-caffeine -40 mg  -40 mg per tablet  Commonly known as: FIORICET, ESGIC  Take 2 tablets by mouth every 8 (eight) hours as needed for Headaches.     citalopram 10 MG tablet  Commonly known as: CeleXA  Take 20 mg by mouth once daily.     famotidine 20 MG tablet  Commonly known as: PEPCID  Take 2 tablets (40 mg total) by mouth 2 (two) times daily.     fluticasone propionate 50 mcg/actuation nasal spray  Commonly known as: FLONASE  1 spray (50 mcg total) by Each Nare route 2 (two) times daily as needed for Rhinitis.     furosemide 20 MG tablet  Commonly known as: LASIX  Take 20 mg by mouth 2 (two) times daily.     gemfibroziL 600 MG tablet  Commonly known as: LOPID  Take 600 mg by mouth 2 (two) times daily before meals.     ibuprofen 600 MG tablet  Commonly known as: ADVIL,MOTRIN  Take 1 tablet (600 mg total) by mouth every 6 (six) hours as needed for Pain (Take with food as needed for mild-to-moderate pain).     levothyroxine 100 MCG tablet  Commonly known as: SYNTHROID  Take 100 mcg by mouth once daily.     metFORMIN 1000 MG tablet  Commonly known as: GLUCOPHAGE  Take 1,000 mg by mouth 2 (two) times daily with meals.     naproxen 500 MG tablet  Commonly known as: NAPROSYN  Take 1 tablet (500 mg total) by mouth 2 (two) times daily.     omeprazole 20 MG capsule  Commonly known as: PRILOSEC  Take 20 mg by mouth once daily.     ondansetron 4 MG Tbdl  Commonly known as: ZOFRAN-ODT  Take 1 tablet (4 mg total) by mouth every 6 (six) hours as needed (nausea).              Indwelling Lines/Drains at time of discharge:   Lines/Drains/Airways       None                   Time spent on the discharge of patient: 37 minutes         Kathy Puri PA-C  Department of Hospital Medicine  [facility]

## 2025-03-28 NOTE — HOSPITAL COURSE
59 y.o. female admitted for further evaluation of epigastric abdominal pain with associated nausea. GI consulted. US unremarkable for choledocholithiasis with normal biliary ducts and bilirubin normal. CT and US noted gallstones. Lfts elevated, with suspicion of gallstone pancreatitis vs cholelithiasis and ozempic caused pancreatitis. General Surgery was consulted and MRCP obtained. Results showed cholelithiasis with no findings to suggest acute cholecystitis, extrahepatic biliary ductal dilatation, choledocholithiasis, stricture, or mass. Patient underwent laparoscopic cholecystectomy on 03/26. She began to complaining of severe pain to the RUQ s/p procedure. She was started on a multimodal pain regimen and given simethicone with notable improvement of pain. Prior to discharge, patient was without significant abdominal pain, n/v, fever or chills. She reported passing flatus. She had not yet had a bowel movement, however, reports frequent episodes of constipation, which resolves with her home bowel regimen. She was told to return to the ED in case of severe pain, n/v, fever, chills, or myalgias. She was instructed to hold Ozempic until follow-up appointment with PCP. Patient is hemodynamically stable for discharge.

## 2025-03-28 NOTE — PLAN OF CARE
Pain management; monitor against infection  Problem: Adult Inpatient Plan of Care  Goal: Plan of Care Review  Outcome: Progressing  Goal: Patient-Specific Goal (Individualized)  Outcome: Progressing  Goal: Absence of Hospital-Acquired Illness or Injury  Outcome: Progressing  Goal: Optimal Comfort and Wellbeing  Outcome: Progressing  Goal: Readiness for Transition of Care  Outcome: Progressing     Problem: Adult Inpatient Plan of Care  Goal: Plan of Care Review  Outcome: Progressing  Goal: Patient-Specific Goal (Individualized)  Outcome: Progressing  Goal: Absence of Hospital-Acquired Illness or Injury  Outcome: Progressing  Goal: Optimal Comfort and Wellbeing  Outcome: Progressing  Goal: Readiness for Transition of Care  Outcome: Progressing

## 2025-03-28 NOTE — PLAN OF CARE
Case Management Assessment     PCP: Gio Elizabeth MD    Pharmacy:   [id number] - [place] - [address]  [address]  [place] [id number]  Phone: [phone] Fax: [phone]    Patient Arrived From: Home  Existing Help at Home: pt's     Barriers to Discharge: None identified    Discharge Plan:    A. Home with family   B. Home    CM met with pt for dc planning assessment. Pt lives with her . She's independent with ADLs and uses no DME. Her  will provide transportation home on discharge. CM will continue to follow.     03/28/25 0963   Discharge Assessment   Assessment Type Discharge Planning Assessment   Confirmed/corrected address, phone number and insurance Yes   Confirmed Demographics Correct on Facesheet   Source of Information patient   Communicated PEDRITO with patient/caregiver Date not available/Unable to determine   People in Home spouse   Do you expect to return to your current living situation? Yes   Do you have help at home or someone to help you manage your care at home? Yes   Who are your caregiver(s) and their phone number(s)? Micky Robert () [phone]   Current cognitive status: Alert/Oriented   Walking or Climbing Stairs Difficulty no   Dressing/Bathing Difficulty no   Equipment Currently Used at Home none   Readmission within 30 days? No   Patient currently being followed by outpatient case management? No   Do you currently have service(s) that help you manage your care at home? No   Do you take prescription medications? Yes   Do you have prescription coverage? Yes   Coverage [facility] Medicaid   Do you have any problems affording any of your prescribed medications? No   Is the patient taking medications as prescribed? yes   Who is going to help you get home at discharge?    How do you get to doctors appointments? car, drives self;family or friend will provide   Are you on dialysis? No   Do you take coumadin? No   Discharge Plan A Home  with family   Discharge Plan B Home   DME Needed Upon Discharge  none   Discharge Plan discussed with: Patient   Transition of Care Barriers None

## 2025-03-28 NOTE — PLAN OF CARE
Case Management Final Discharge Note    Discharge Disposition: Home    New DME ordered / company name: None    Relevant SDOH / Transition of Care Barriers:  None identified    Person available to provide assistance at home when needed and their contact information: Mickylatasha Robert [phone]    Scheduled followup appointment: PCP 4/3, Gen surg 4/10    Referrals placed: None    Transportation: private vehicle    Patient educated on discharge services and updated on DC plan. Bedside RN notified, patient clear to discharge from Case Management Perspective.      03/28/25 1010   Final Note   Assessment Type Final Discharge Note   Anticipated Discharge Disposition Home   Hospital Resources/Appts/Education Provided Appointments scheduled and added to AVS;Provided patient/caregiver with written discharge plan information   Post-Acute Status   Coverage [facility] Medicaid   Discharge Delays None known at this time

## 2025-03-28 NOTE — ASSESSMENT & PLAN NOTE
Patient's blood pressure range in the last 24 hours was: BP  Min: 130/64  Max: 164/74.The patient's inpatient anti-hypertensive regimen is listed below:  Current Antihypertensives  hydrALAZINE injection 10 mg, Every 6 hours PRN, Intravenous  furosemide tablet 20 mg, 2 times daily, Oral  losartan tablet 25 mg, Daily, Oral    Plan  - BP is controlled, no changes needed to their regimen

## 2025-03-31 DIAGNOSIS — G43.909 HEADACHE, MIGRAINE: Primary | ICD-10-CM

## 2025-03-31 LAB
ESTROGEN SERPL-MCNC: NORMAL PG/ML
INSULIN SERPL-ACNC: NORMAL U[IU]/ML
LAB AP CLINICAL INFORMATION: NORMAL
LAB AP GROSS DESCRIPTION: NORMAL
LAB AP PERFORMING LOCATION(S): NORMAL
LAB AP REPORT FOOTNOTES: NORMAL
T3RU NFR SERPL: NORMAL %

## 2025-04-10 ENCOUNTER — OFFICE VISIT (OUTPATIENT)
Dept: SURGERY | Facility: CLINIC | Age: 60
End: 2025-04-10
Payer: MEDICAID

## 2025-04-10 VITALS
WEIGHT: 205 LBS | BODY MASS INDEX: 35 KG/M2 | DIASTOLIC BLOOD PRESSURE: 62 MMHG | HEART RATE: 80 BPM | HEIGHT: 64 IN | SYSTOLIC BLOOD PRESSURE: 97 MMHG

## 2025-04-10 DIAGNOSIS — Z90.49 S/P LAPAROSCOPIC CHOLECYSTECTOMY: Primary | ICD-10-CM

## 2025-04-10 PROCEDURE — 4010F ACE/ARB THERAPY RXD/TAKEN: CPT | Mod: CPTII,,, | Performed by: STUDENT IN AN ORGANIZED HEALTH CARE EDUCATION/TRAINING PROGRAM

## 2025-04-10 PROCEDURE — 99213 OFFICE O/P EST LOW 20 MIN: CPT | Mod: PBBFAC | Performed by: STUDENT IN AN ORGANIZED HEALTH CARE EDUCATION/TRAINING PROGRAM

## 2025-04-10 PROCEDURE — 1159F MED LIST DOCD IN RCRD: CPT | Mod: CPTII,,, | Performed by: STUDENT IN AN ORGANIZED HEALTH CARE EDUCATION/TRAINING PROGRAM

## 2025-04-10 PROCEDURE — 3074F SYST BP LT 130 MM HG: CPT | Mod: CPTII,,, | Performed by: STUDENT IN AN ORGANIZED HEALTH CARE EDUCATION/TRAINING PROGRAM

## 2025-04-10 PROCEDURE — 99999 PR PBB SHADOW E&M-EST. PATIENT-LVL III: CPT | Mod: PBBFAC,,, | Performed by: STUDENT IN AN ORGANIZED HEALTH CARE EDUCATION/TRAINING PROGRAM

## 2025-04-10 PROCEDURE — 3078F DIAST BP <80 MM HG: CPT | Mod: CPTII,,, | Performed by: STUDENT IN AN ORGANIZED HEALTH CARE EDUCATION/TRAINING PROGRAM

## 2025-04-10 PROCEDURE — 99024 POSTOP FOLLOW-UP VISIT: CPT | Mod: ,,, | Performed by: STUDENT IN AN ORGANIZED HEALTH CARE EDUCATION/TRAINING PROGRAM

## 2025-04-10 NOTE — PROGRESS NOTES
"    Post-Op Follow-up Visit:   4/10/2025  Patient ID: Kenna Robert is a 59 y.o. female, born 1965    Chief Complaint   Patient presents with    Post-op Evaluation       Interval History: Patient feels "ok" after surgery. She has mild linda incisional abdominal pain. No nausea or vomiting. No fevers or chills. She has had regular GI function. She describes sensitivity to light touch at the incision sites. She tried to dress them with gauze and tape to prevent friction with her clothing but then had mild skin tears from the tape.    Physical Exam:  BP 97/62 (BP Location: Right arm, Patient Position: Sitting)   Pulse 80   Ht 5' 4" (1.626 m)   Wt 93 kg (205 lb 0.4 oz)   BMI 35.19 kg/m²     General:  Non-toxic, ambulatory  Abd:  Soft, non-tender  Incision:  clean, dry and intact. She has some skin tears from tape    Pathology:  Pathology Results  (Last 10 years)                 03/26/25 1459  Specimen to Pathology Final result             No diagnosis found.Plan   Final Diagnosis   1. Gallbladder, cholecystectomy:  Chronic cholecystitis and cholelithiasis        Plan:  Follow up PRN  No heavy lifting tor  weeks.  Counseled patient to use paper tape if necessary and to alternate placement to avoid further skin tears.    Christina Mcfarland MD   General Surgery  [facility]          "

## 2025-04-24 ENCOUNTER — TELEPHONE (OUTPATIENT)
Dept: GASTROENTEROLOGY | Facility: CLINIC | Age: 60
End: 2025-04-24
Payer: MEDICAID

## 2025-04-24 NOTE — TELEPHONE ENCOUNTER
----- Message from Med Assistant [place] sent at 4/24/2025  8:55 AM CDT -----  Regarding: FW: Pancreatic clinic appt  Can you schedule any provider date and time is ok  ----- Message -----  From: Deidre Collier MA  Sent: 4/7/2025   3:06 PM CDT  To: Suzanne Arizmendi MA  Subject: FW: Pancreatic clinic appt                         ----- Message -----  From: Claire Sarabia NP  Sent: 4/7/2025   1:34 PM CDT  To: Deidre Collier MA; Saul Lafleur PA-C  Subject: Pancreatic clinic appt                           Good AfternoonWould you please schedule this patient for an appt with Pancreatic Clinic , for MRCP notable for suspect cyst on pancreatic tail.  Any available provider is fine.Thanks,Claire Sarabia NP

## 2025-06-12 DIAGNOSIS — G43.909 HEADACHE, MIGRAINE: Primary | ICD-10-CM

## 2025-08-23 ENCOUNTER — HOSPITAL ENCOUNTER (EMERGENCY)
Facility: HOSPITAL | Age: 60
Discharge: HOME | End: 2025-08-23
Attending: EMERGENCY MEDICINE
Payer: MEDICAID

## 2025-08-23 VITALS
WEIGHT: 191 LBS | HEIGHT: 64 IN | OXYGEN SATURATION: 97 % | SYSTOLIC BLOOD PRESSURE: 121 MMHG | BODY MASS INDEX: 32.61 KG/M2 | HEART RATE: 76 BPM | DIASTOLIC BLOOD PRESSURE: 67 MMHG | RESPIRATION RATE: 20 BRPM

## 2025-08-23 DIAGNOSIS — N39.0 URINARY TRACT INFECTION WITHOUT HEMATURIA, SITE UNSPECIFIED: Primary | ICD-10-CM

## 2025-08-23 LAB
ALBUMIN SERPL-MCNC: 4 G/DL (ref 3.3–5.5)
ALP SERPL-CCNC: 67 U/L (ref 42–141)
BILIRUB SERPL-MCNC: 0.5 MG/DL (ref 0.2–1.6)
BILIRUBIN, POC UA: NEGATIVE
BLOOD, POC UA: ABNORMAL
BUN SERPL-MCNC: 17 MG/DL (ref 7–22)
CALCIUM SERPL-MCNC: 11 MG/DL (ref 8–10.3)
CHLORIDE SERPL-SCNC: 107 MMOL/L (ref 98–108)
CLARITY, UA: CLEAR
COLOR, UA: YELLOW
CREAT SERPL-MCNC: 1.1 MG/DL (ref 0.6–1.2)
GLUCOSE SERPL-MCNC: 115 MG/DL (ref 73–118)
GLUCOSE, POC UA: NEGATIVE
KETONES, POC UA: NEGATIVE
LEUKOCYTE EST, POC UA: ABNORMAL
Lab: 0.5 10 9/L (ref 0.1–1.5)
Lab: 12.6 G/DL (ref 11.5–16.5)
Lab: 14.3 % (ref 11–16)
Lab: 2.4 10 9/L (ref 0.5–5)
Lab: 273 10 9/L (ref 100–400)
Lab: 29.3 PG (ref 25–35)
Lab: 33.2 % (ref 15–50)
Lab: 34.7 G/DL (ref 31–38)
Lab: 4.3 10 9/L (ref 1.2–8)
Lab: 4.31 10 12/L (ref 3.5–5.5)
Lab: 6.8 % (ref 2–15)
Lab: 60 % (ref 35–80)
Lab: 7.2 10 9/L (ref 3.5–10)
Lab: 84.5 FL (ref 75–100)
Lab: 9.2 FL (ref 8–11)
NITRITE, POC UA: NEGATIVE
PH UR STRIP: 7 [PH] (ref 5–8)
POC ALT (SGPT): 29 U/L (ref 10–47)
POC AST (SGOT): 36 U/L (ref 11–38)
POC HCT: 36.5 % (ref 35–55)
POC TCO2: 29 MMOL/L (ref 18–33)
POTASSIUM BLD-SCNC: 4.5 MMOL/L (ref 3.6–5.1)
PROTEIN, POC UA: ABNORMAL
PROTEIN, POC: 7.5 G/DL (ref 6.4–8.1)
SODIUM BLD-SCNC: 142 MMOL/L (ref 128–145)
SPECIFIC GRAVITY, POC UA: 1.02 (ref 1–1.03)
UROBILINOGEN, POC UA: 1 E.U./DL

## 2025-08-23 PROCEDURE — 25000003 PHARM REV CODE 250: Mod: ER | Performed by: EMERGENCY MEDICINE

## 2025-08-23 PROCEDURE — 99284 EMERGENCY DEPT VISIT MOD MDM: CPT | Mod: 25,ER

## 2025-08-23 PROCEDURE — 96374 THER/PROPH/DIAG INJ IV PUSH: CPT | Mod: ER

## 2025-08-23 PROCEDURE — 87186 SC STD MICRODIL/AGAR DIL: CPT

## 2025-08-23 PROCEDURE — 85025 COMPLETE CBC W/AUTO DIFF WBC: CPT | Mod: ER

## 2025-08-23 PROCEDURE — 80053 COMPREHEN METABOLIC PANEL: CPT | Mod: ER

## 2025-08-23 PROCEDURE — 96375 TX/PRO/DX INJ NEW DRUG ADDON: CPT | Mod: ER

## 2025-08-23 PROCEDURE — 63600175 PHARM REV CODE 636 W HCPCS: Mod: ER

## 2025-08-23 PROCEDURE — 25000003 PHARM REV CODE 250: Mod: ER

## 2025-08-23 RX ORDER — CEFTRIAXONE 2 G/1
2 INJECTION, POWDER, FOR SOLUTION INTRAMUSCULAR; INTRAVENOUS
Status: COMPLETED | OUTPATIENT
Start: 2025-08-23 | End: 2025-08-23

## 2025-08-23 RX ORDER — ONDANSETRON 4 MG/1
4 TABLET, ORALLY DISINTEGRATING ORAL EVERY 8 HOURS PRN
Qty: 15 TABLET | Refills: 0 | Status: SHIPPED | OUTPATIENT
Start: 2025-08-23

## 2025-08-23 RX ORDER — PHENAZOPYRIDINE HYDROCHLORIDE 100 MG/1
100 TABLET, FILM COATED ORAL
Status: COMPLETED | OUTPATIENT
Start: 2025-08-23 | End: 2025-08-23

## 2025-08-23 RX ORDER — PHENAZOPYRIDINE HYDROCHLORIDE 200 MG/1
200 TABLET, FILM COATED ORAL
Qty: 9 TABLET | Refills: 0 | Status: SHIPPED | OUTPATIENT
Start: 2025-08-23 | End: 2025-08-26

## 2025-08-23 RX ORDER — CEPHALEXIN 500 MG/1
500 CAPSULE ORAL 4 TIMES DAILY
Qty: 28 CAPSULE | Refills: 0 | Status: SHIPPED | OUTPATIENT
Start: 2025-08-23 | End: 2025-08-30

## 2025-08-23 RX ORDER — PHENAZOPYRIDINE HYDROCHLORIDE 100 MG/1
200 TABLET, FILM COATED ORAL
Status: COMPLETED | OUTPATIENT
Start: 2025-08-23 | End: 2025-08-23

## 2025-08-23 RX ORDER — ONDANSETRON HYDROCHLORIDE 2 MG/ML
4 INJECTION, SOLUTION INTRAVENOUS
Status: COMPLETED | OUTPATIENT
Start: 2025-08-23 | End: 2025-08-23

## 2025-08-23 RX ADMIN — PHENAZOPYRIDINE 100 MG: 100 TABLET ORAL at 01:08

## 2025-08-23 RX ADMIN — ONDANSETRON 4 MG: 2 INJECTION INTRAMUSCULAR; INTRAVENOUS at 01:08

## 2025-08-23 RX ADMIN — CEFTRIAXONE 2 G: 2 INJECTION, POWDER, FOR SOLUTION INTRAMUSCULAR; INTRAVENOUS at 01:08

## 2025-08-25 LAB — BACTERIA UR CULT: ABNORMAL

## (undated) DEVICE — SUT ETHIBOND EXCEL 0 MO6 18

## (undated) DEVICE — SEAL CANN UNIVERSAL 5-12MM

## (undated) DEVICE — COVER OVERHEAD SURG LT BLUE

## (undated) DEVICE — HEMOCLIPS GREEN

## (undated) DEVICE — ELECTRODE PATIENT RETURN DISP

## (undated) DEVICE — PAD PINK TRENDELENBURG POS XL

## (undated) DEVICE — DRAPE COLUMN DAVINCI XI

## (undated) DEVICE — SYR 10CC LUER LOCK

## (undated) DEVICE — BLANKET WARMING UPPER BODY

## (undated) DEVICE — CANISTER SUCTION RIGID 2000CC

## (undated) DEVICE — ADHESIVE DERMABOND MINI HV

## (undated) DEVICE — PACK ENDOSCOPY GENERAL

## (undated) DEVICE — DRAPE ARM DAVINCI XI

## (undated) DEVICE — FORCEP FENESTRATED BIPOLAR

## (undated) DEVICE — SOL IRR SOD CHL .9% POUR

## (undated) DEVICE — Device

## (undated) DEVICE — SOL CLEARIFY VISUALIZATION LAP

## (undated) DEVICE — GOWN NONREINF SET-IN SLV XL

## (undated) DEVICE — OBTURATOR BLADELESS 8MM XI CLR

## (undated) DEVICE — CLIP HEMO-LOK MLX LARGE LF

## (undated) DEVICE — SUT MCRYL PLUS 4-0 PS2 27IN

## (undated) DEVICE — NDL HYPO REG 25G X 1 1/2

## (undated) DEVICE — CHLORAPREP W TINT 26ML APPL

## (undated) DEVICE — SCISSOR HOT SHEARS XI

## (undated) DEVICE — CLIP LARGE APPLIER XI

## (undated) DEVICE — GLOVE SIGNATURE ESSNTL LTX 7

## (undated) DEVICE — SOL ELECTROLUBE ANTI-STIC

## (undated) DEVICE — KIT AIRSEAL BIFURCT FLTR TB

## (undated) DEVICE — GLOVE SENSICARE PI GRN 7.5

## (undated) DEVICE — SYR ONLY LUER LOCK 20CC

## (undated) DEVICE — FORCEP CADIERE DA VINCI

## (undated) DEVICE — APPLIER MEDIUM LARGE CLIP

## (undated) DEVICE — CONTAINER SPECIMEN OR STER 4OZ

## (undated) DEVICE — COVER TIP CURVED SCISSORS XI

## (undated) DEVICE — BAG TISSUE RETRIEVAL 5MM

## (undated) DEVICE — HOOK PERM MONOPOLAR CAUTERY

## (undated) DEVICE — DRAPE THREE-QUARTER 53X77IN

## (undated) DEVICE — KIT AIRSEAL CANN CAP STD 8MM